# Patient Record
Sex: MALE | Race: WHITE | NOT HISPANIC OR LATINO | Employment: FULL TIME | URBAN - METROPOLITAN AREA
[De-identification: names, ages, dates, MRNs, and addresses within clinical notes are randomized per-mention and may not be internally consistent; named-entity substitution may affect disease eponyms.]

---

## 2024-01-05 DIAGNOSIS — F41.9 ANXIETY: ICD-10-CM

## 2024-01-05 RX ORDER — CLONAZEPAM 1 MG/1
TABLET ORAL
COMMUNITY
End: 2024-01-05 | Stop reason: SDUPTHER

## 2024-01-05 RX ORDER — VORTIOXETINE 10 MG/1
10 TABLET, FILM COATED ORAL DAILY
COMMUNITY
Start: 2023-12-03 | End: 2024-01-10 | Stop reason: SDUPTHER

## 2024-01-05 RX ORDER — CLONAZEPAM 1 MG/1
TABLET ORAL
Qty: 45 TABLET | Refills: 0 | Status: SHIPPED | OUTPATIENT
Start: 2024-01-05

## 2024-01-05 NOTE — PROGRESS NOTES
Received refill request via email. FU scheduled for 1/10/23 but patient reports he will run out before next visit. Reviewed OARRS on 1/5/23. No discrepancies or concerns noted. Clonazepam 1mg (45 tablets) last filled on 12/6/23. Refill placed.

## 2024-01-10 ENCOUNTER — OFFICE VISIT (OUTPATIENT)
Dept: BEHAVIORAL HEALTH | Facility: CLINIC | Age: 44
End: 2024-01-10
Payer: COMMERCIAL

## 2024-01-10 ENCOUNTER — LAB (OUTPATIENT)
Dept: LAB | Facility: LAB | Age: 44
End: 2024-01-10
Payer: COMMERCIAL

## 2024-01-10 ENCOUNTER — OFFICE VISIT (OUTPATIENT)
Dept: PRIMARY CARE | Facility: CLINIC | Age: 44
End: 2024-01-10
Payer: COMMERCIAL

## 2024-01-10 VITALS
OXYGEN SATURATION: 96 % | HEIGHT: 64 IN | RESPIRATION RATE: 16 BRPM | WEIGHT: 163.58 LBS | HEART RATE: 76 BPM | DIASTOLIC BLOOD PRESSURE: 80 MMHG | SYSTOLIC BLOOD PRESSURE: 124 MMHG | BODY MASS INDEX: 27.93 KG/M2

## 2024-01-10 VITALS
TEMPERATURE: 98 F | HEIGHT: 64 IN | WEIGHT: 163.6 LBS | DIASTOLIC BLOOD PRESSURE: 87 MMHG | BODY MASS INDEX: 27.93 KG/M2 | SYSTOLIC BLOOD PRESSURE: 123 MMHG | HEART RATE: 80 BPM | RESPIRATION RATE: 16 BRPM

## 2024-01-10 DIAGNOSIS — Z00.00 HEALTHCARE MAINTENANCE: ICD-10-CM

## 2024-01-10 DIAGNOSIS — Z00.00 ENCOUNTER FOR PREVENTIVE HEALTH EXAMINATION: ICD-10-CM

## 2024-01-10 DIAGNOSIS — Z00.00 ENCOUNTER FOR PREVENTIVE HEALTH EXAMINATION: Primary | ICD-10-CM

## 2024-01-10 DIAGNOSIS — F51.5 NIGHTMARES: ICD-10-CM

## 2024-01-10 DIAGNOSIS — F41.9 ANXIETY: ICD-10-CM

## 2024-01-10 DIAGNOSIS — F33.9 RECURRENT MAJOR DEPRESSIVE DISORDER, REMISSION STATUS UNSPECIFIED (CMS-HCC): ICD-10-CM

## 2024-01-10 DIAGNOSIS — F43.9 TRAUMA AND STRESSOR-RELATED DISORDER: ICD-10-CM

## 2024-01-10 PROBLEM — F32.9 MDD (MAJOR DEPRESSIVE DISORDER): Status: ACTIVE | Noted: 2024-01-10

## 2024-01-10 LAB
ALBUMIN SERPL BCP-MCNC: 4.6 G/DL (ref 3.4–5)
ALP SERPL-CCNC: 65 U/L (ref 33–120)
ALT SERPL W P-5'-P-CCNC: 41 U/L (ref 10–52)
ANION GAP SERPL CALC-SCNC: 14 MMOL/L (ref 10–20)
AST SERPL W P-5'-P-CCNC: 25 U/L (ref 9–39)
BILIRUB SERPL-MCNC: 0.4 MG/DL (ref 0–1.2)
BUN SERPL-MCNC: 18 MG/DL (ref 6–23)
CALCIUM SERPL-MCNC: 10 MG/DL (ref 8.6–10.6)
CHLORIDE SERPL-SCNC: 100 MMOL/L (ref 98–107)
CHOLEST SERPL-MCNC: 201 MG/DL (ref 0–199)
CHOLESTEROL/HDL RATIO: 5.6
CO2 SERPL-SCNC: 30 MMOL/L (ref 21–32)
CREAT SERPL-MCNC: 0.79 MG/DL (ref 0.5–1.3)
EGFRCR SERPLBLD CKD-EPI 2021: >90 ML/MIN/1.73M*2
ERYTHROCYTE [DISTWIDTH] IN BLOOD BY AUTOMATED COUNT: 13 % (ref 11.5–14.5)
EST. AVERAGE GLUCOSE BLD GHB EST-MCNC: 94 MG/DL
GLUCOSE SERPL-MCNC: 76 MG/DL (ref 74–99)
HBA1C MFR BLD: 4.9 %
HCT VFR BLD AUTO: 48.8 % (ref 41–52)
HDLC SERPL-MCNC: 36 MG/DL
HGB BLD-MCNC: 16.4 G/DL (ref 13.5–17.5)
LDLC SERPL CALC-MCNC: 86 MG/DL
MCH RBC QN AUTO: 28.9 PG (ref 26–34)
MCHC RBC AUTO-ENTMCNC: 33.6 G/DL (ref 32–36)
MCV RBC AUTO: 86 FL (ref 80–100)
NON HDL CHOLESTEROL: 165 MG/DL (ref 0–149)
NRBC BLD-RTO: 0 /100 WBCS (ref 0–0)
PLATELET # BLD AUTO: 261 X10*3/UL (ref 150–450)
POTASSIUM SERPL-SCNC: 4.1 MMOL/L (ref 3.5–5.3)
PROT SERPL-MCNC: 7.5 G/DL (ref 6.4–8.2)
RBC # BLD AUTO: 5.67 X10*6/UL (ref 4.5–5.9)
SODIUM SERPL-SCNC: 140 MMOL/L (ref 136–145)
TRIGL SERPL-MCNC: 397 MG/DL (ref 0–149)
VLDL: 79 MG/DL (ref 0–40)
WBC # BLD AUTO: 9.5 X10*3/UL (ref 4.4–11.3)

## 2024-01-10 PROCEDURE — 1036F TOBACCO NON-USER: CPT | Performed by: INTERNAL MEDICINE

## 2024-01-10 PROCEDURE — 80053 COMPREHEN METABOLIC PANEL: CPT

## 2024-01-10 PROCEDURE — 99214 OFFICE O/P EST MOD 30 MIN: CPT

## 2024-01-10 PROCEDURE — 85027 COMPLETE CBC AUTOMATED: CPT

## 2024-01-10 PROCEDURE — 83036 HEMOGLOBIN GLYCOSYLATED A1C: CPT

## 2024-01-10 PROCEDURE — 99396 PREV VISIT EST AGE 40-64: CPT | Performed by: INTERNAL MEDICINE

## 2024-01-10 PROCEDURE — 36415 COLL VENOUS BLD VENIPUNCTURE: CPT

## 2024-01-10 PROCEDURE — 80061 LIPID PANEL: CPT

## 2024-01-10 RX ORDER — CLONAZEPAM 1 MG/1
1.5 TABLET ORAL DAILY PRN
Qty: 45 TABLET | Refills: 0 | Status: SHIPPED | OUTPATIENT
Start: 2024-04-01

## 2024-01-10 RX ORDER — ACETAMINOPHEN 500 MG
5 TABLET ORAL DAILY PRN
COMMUNITY

## 2024-01-10 RX ORDER — VORTIOXETINE 10 MG/1
10 TABLET, FILM COATED ORAL DAILY
Qty: 90 TABLET | Refills: 1 | Status: SHIPPED | OUTPATIENT
Start: 2024-01-10 | End: 2024-04-03 | Stop reason: SDUPTHER

## 2024-01-10 RX ORDER — AZITHROMYCIN 500 MG/1
TABLET, FILM COATED ORAL
COMMUNITY
Start: 2023-03-07

## 2024-01-10 RX ORDER — CLONAZEPAM 1 MG/1
1.5 TABLET ORAL DAILY PRN
Qty: 45 TABLET | Refills: 0 | Status: SHIPPED | OUTPATIENT
Start: 2024-03-03

## 2024-01-10 RX ORDER — CLONAZEPAM 1 MG/1
1.5 TABLET ORAL DAILY PRN
Qty: 45 TABLET | Refills: 0 | Status: SHIPPED | OUTPATIENT
Start: 2024-02-03 | End: 2025-02-02

## 2024-01-10 RX ORDER — ATOVAQUONE AND PROGUANIL HYDROCHLORIDE 250; 100 MG/1; MG/1
TABLET, FILM COATED ORAL
COMMUNITY
Start: 2023-03-07

## 2024-01-10 NOTE — PROGRESS NOTES
"Subjective   Patient ID: Jaycob Caputo is a 43 y.o. male who presents for Anxiety, Depression, Med Management, and Nightmares Last visit with this writer on 9/13/23.     HPI  Patient arrived on-time for in-person visit. A/Ox3. When asked how he is doing patient reports \"doing pretty well\". Appreciating being in a supportive relationship - \"pretty stable with that\". Work - \"tough\". Describes feelings of burnout at work, especially when things are slow. Working to resume exercise routine which has been very helpful for mood/anxiety. Covid + in october, no lingering symptoms. + URI in November which hindered exercise. Looking forward to international volunteer trip to [facility] in April with friend (nataly). Mood - improving. \"A little stressful at times\" with adjustment in relationship but overall reports feeling \"happy\". Continues to see establish counselor twice per month, comfortable with frequency     Mood: \"generally pretty good\"   - intermittent overwhelmed/burnout days   No anhedonia - enjoying [employer] games/time with GF/social interactions   Sleep: 6-7hours/night - nightmares continue intermittently but do not disrupt sleep.  Energy levels: + fatigue with recent illness, improving.   Focus/concentration: adequate. Focused during visit.   hopelessness/guilt - denies   No SI/HI or thoughts of death.      Anxiety:   Stable   Intermittent overwhelmed days   intermittent worrying - using coping to skills manage high anxiety days   No daytime panic attacks/feeling overwhelmed     The[place]: [place] [facility] ([facility] [place] bi-weekly.    MJ use: infrequent MJ edible use (1-2x/month).   Alcohol use: 5 drinks/week.   No illicit drug use  No caffeine: 1-2 cups of tea most mornings. Rare energy drinks      Objective   Physical Exam  Constitutional:       Appearance: Normal appearance.   Neurological:      Mental Status: He is alert and oriented to person, place, and time.     General Appearance: Well " "groomed, appropriate eye contact  Attitude/Behavior: Cooperative  Motor: No psychomotor agitation or retardation, no tremor or other abnormal movements  Speech: Normal rate, volume, prosody  Gait/Station: WFL - Within functional limits  Mood: \"generally pretty good\"  Affect: Constricted  Thought Process: Linear, goal directed  Thought Associations: No loosening of associations  Perception: No perceptual abnormalities noted  Sensorium: Alert and oriented to person, place, time and situation  Insight: Intact  Judgement: Intact  Cognition: Cognitively intact to conversational testing with respect to attention, orientation, fund of knowledge, recent and remote memory, and language    Lab Review:   not applicable    Assessment/Plan   Problem List Items Addressed This Visit             ICD-10-CM    Anxiety F41.9    Relevant Medications    Trintellix 10 mg tablet tablet    MDD (major depressive disorder) F32.9    Relevant Medications    Trintellix 10 mg tablet tablet    Trauma and stressor-related disorder F43.9    Relevant Medications    Trintellix 10 mg tablet tablet    Nightmares F51.5   Mr. Caputo is a 41 year old male currently living alone in [place], [place]. Employed full-time as a  and works in a group home. PPhx of anxiety, depression and nightmares/intrusive thoughts. He describes having never been formally diagnosed with PTSD but that he and Dr. Demarco have discussed that many of his symptoms appear to correlate with past traumas experienced.      DSM-5 Diagnosis   anxiety disorder   MDD  Nightmares vs night terrors   unspecified trauma and stressor related disorder      Current Medications  Trintellix 10mg daily   Clonazepam 1.5mg daily PRN - last filled on 1/5/24 for 45 tablets/30days   - previously on 2mg daily, now 1mg most days with intermittent PRN dose (requiring 3x the past two weeks)  - Reviewed OARRS on 1/10/24, no discrepancies or concerns noted.   - Drug screen + cannabinoid level " completed on 1/23/23   - controlled substance agreement completed on 9/26/22     - mood is stable. No SI/HI  - anxiety stable   - No daytime panic attacks  -c/w Trintellix 10mg daily, c/w Clonazepam 1mg (1.5 tablets) daily PRN  - Encouraged to continue diet/exercise regiment and healthy choices   - Encouraged to continue in counseling   - yearly drug screen ordered per protocol.   - CSA completed this visit   - encouraged patient to communicate any questions, concerns or side effects if recognized.   - patient is aware this writer is leaving  March 2024, He wishes to continue care with  Psychiatry.   - discussed transitioning care to [facility] (where counselor is ) as a plan if needed   - Jaycob is agreeable to plan detailed above     Start time 1:02pm  End time 1:35pm   Prep/charting time 5min   Total time 38min

## 2024-01-10 NOTE — PATIENT INSTRUCTIONS
Continue Trintellix 10mg daily   Continue Clonazepam 1.5mg daily as needed  Please call  Psychiatry ([phone]) to schedule visit with next provider   Take care Mr. Caputo, it was a pleasure working with you

## 2024-01-10 NOTE — PROGRESS NOTES
"Complete Physical Exam    Jaycob is a pleasant 43-year-old male here for physical.  Has started exercising and eating better.  Feeling much better.  No complaints today.  Follows with psychiatry.  Symptoms well-controlled.    Family history, social history reviewed.  Does not smoke, no drugs.  Drinks very occasionally.    ROS:  No unintentional weight gain or weight loss, no fevers no chills, no night sweats.  No fatigue.    No congestion runny nose sore throat.  No ear pain.  No tinnitus.  No change in hearing.  No change in vision.    No neck pain.    No cough no shortness of breath.  No wheezing.    No chest pain shortness of breath palpitations with rest or with activity.  No orthopnea no PND.  No edema.    No abdominal pain, no nausea vomiting diarrhea.  No dark stools.  No dysphagia, no anorexia.  Appetite intact.    No heat or cold intolerance, constipation diarrhea, weight changes.  No polyuria polydipsia.      No joint pain besides occasional aches and pains,  No muscle weakness.    No new rash.    No headache, no change in memory, no change in cognition.  No weakness numbness tingling of extremities.  No difficulty with gait.    No easy bruisability.    No feeling of sadness, loss of interest, anxiety.    No difficulty urinating, no dribbling, no hesitancy.  No testicular pain.    Vitals and exam:Blood pressure 124/80, pulse 76, resp. rate 16, height 1.626 m (5' 4\"), weight 74.2 kg (163 lb 9.3 oz), SpO2 96 %.  Calm coherent well-appearing.    Neck is supple with no tenderness, thyroid mobile soft with no nodules, oropharynx clear.  Sinuses nontender.    Breathing comfortably, clear to auscultation bilaterally.    Regular rate and rhythm, no murmur gallops or rubs, good distal pulses.  No edema.  No JVD.  No carotid bruit.    Abdomen is soft, nontender, normal bowel sounds.  No ascites.  No hepatosplenomegaly.    Upper and lower extremity joints intact with full active range of motion.  Strength is 5 out " of 5 in upper and lower extremities.    Skin is grossly normal, moist.     Extremity warm, well-perfused, no clubbing or cyanosis.    Coherent, cognition intact, memory intact.  Gait intact.    No cervical, supraclavicular, axillary or inguinal lymphadenopathy.          Assessment plan: Jaycob is a pleasant 43-year-old male here for physical, overall doing well, has improved his lifestyle.  Immunization up-to-date, check routine labs today.  Follow-up in 1 year.

## 2024-04-03 ENCOUNTER — OFFICE VISIT (OUTPATIENT)
Dept: BEHAVIORAL HEALTH | Facility: CLINIC | Age: 44
End: 2024-04-03
Payer: COMMERCIAL

## 2024-04-03 VITALS
SYSTOLIC BLOOD PRESSURE: 136 MMHG | TEMPERATURE: 98 F | HEART RATE: 73 BPM | RESPIRATION RATE: 16 BRPM | WEIGHT: 166.7 LBS | BODY MASS INDEX: 28.46 KG/M2 | HEIGHT: 64 IN | DIASTOLIC BLOOD PRESSURE: 90 MMHG

## 2024-04-03 DIAGNOSIS — F41.9 ANXIETY: ICD-10-CM

## 2024-04-03 DIAGNOSIS — F33.9 RECURRENT MAJOR DEPRESSIVE DISORDER, REMISSION STATUS UNSPECIFIED (CMS-HCC): ICD-10-CM

## 2024-04-03 DIAGNOSIS — F43.9 TRAUMA AND STRESSOR-RELATED DISORDER: ICD-10-CM

## 2024-04-03 PROCEDURE — 99213 OFFICE O/P EST LOW 20 MIN: CPT | Performed by: PSYCHIATRY & NEUROLOGY

## 2024-04-03 RX ORDER — VORTIOXETINE 10 MG/1
10 TABLET, FILM COATED ORAL DAILY
Qty: 30 TABLET | Refills: 0 | Status: SHIPPED | OUTPATIENT
Start: 2024-04-03 | End: 2024-05-03

## 2024-04-03 RX ORDER — CLONAZEPAM 1 MG/1
1 TABLET ORAL 2 TIMES DAILY
Qty: 60 TABLET | Refills: 0 | Status: SHIPPED | OUTPATIENT
Start: 2024-05-02 | End: 2024-06-10 | Stop reason: SDUPTHER

## 2024-04-03 NOTE — PROGRESS NOTES
"Outpatient Psychiatry    Subjective   Jaycob Caputo, a 43 y.o. male, presenting to Psychiatry for evaluation.  Patient is referred by Lopez Burton MD.         Chief Complaint: \"I am okay\"    HPI:    Patient reports that he had been seeing Dr. Ivan Demarco for 15 years.      He rerports one of his biggest stressors is that his step brother has cancer and is unlikely to survive.  He is scheduled to leave Sunday for [facility] with [facility] to help with turtle conservation and he feels badly that he cannot be here for him.      He attended [place] [employer] school and loves working with kids.  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     He is in a recent relationship for the past 8 months and met her at a [employer].  He reports being very happy in this relationship.      Patient takes Melatonin  PRN  and Klonipin PRN which has recently been reduced.      Patient reports chronic nightmares which started at the age of 17.  His nightmares can be \"intense\" and he can wake up in a panic and he  feels completely rigid and unable to move.  He will get up and take Klonipin at this time, listen to music, go to the  gym and the episode will subside.  He does not have  panic attacks during the day.      Patient reports a long history of anxiety and depression and he has been on Trintellix for a few years which has been helpful.  He would like to decrease the Trintellix early this summer.      He currently denies any depression.    He does feel overwhelmed and \"burned out\" from work and his step brother being ill.  He has a lot of stress at work and he began doing [employer] on the side for his expenses such as his trip to [place].      Patient denies SI, HI, manic or psychotic symptoms.      Psychiatric Review Of Systems:  Depressive Symptoms: negative  Manic Symptoms: negative  Anxiety Symptoms: Panic AttackPanic Attack Behaviors: wakes up from nightmares feeling panicked, " rigid   Psychotic Symptoms: negative  Other Symptoms:    Current Medications:  Trintellix 10 mg PO daily  Klonipin 1 mg PO BID PRN    Medical History:  No past medical history on file.    Past Psychiatric History:   Diagnoses:   possible PTSD, anxiety, depression  Previous Psychiatrist:  Therapy:   Mika Laurent, specializes in nightmares/night time behavior - is on leave currently, he has appointment May 1, 2024  Current psychiatric medications:  Past psychiatric medications: Cymbalta ok; SSRIs cannot take - gets more anxious;   Past psychiatric treatments:   Hospitalizations:none  Suicide attempts: none  Family psychiatric history:unknown    Social History:   Currently lives:  [place], lives with self, has girlfriend who has kids,  8 months who is ; she does house cleaning; he tried online dating; kids ages 14, 11 and 8    Education:   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; [employer]    History of Learning Problem: none reported  Work/Finances:  car business for 6 years; ran summer camp; [employer]   Marital history/children: none  Current stressors:  step brother has cancer  Social support: girlfriend   Legal History: none   History: none  Family:   age 11; parents remarried   Siblings: blood brother age 39, 4 step sisters and 2 step brother;  end stage cancer step brother age 46 have 8 year old  History of violence: none reported    Substance Use History:  Tobacco use: none  Use of alcohol:  unknown  Use of caffeine:  unknown  Use of other substances: unknown  Legal consequences of substance use: n/a  Substance use disorder treatment: n/a    Record Review: brief     Medical Review Of Systems:  Pertinent items are noted in HPI.    OARRS:  No data recorded  I have personally reviewed the OARRS report for Jaycob Caputo. I have considered the risks of abuse, dependence, addiction and diversion and I  "believe that it is clinically appropriate for Jaycob Caputo to be prescribed this medication    Is the patient prescribed a combination of a benzodiazepine and opioid?  No    Last Urine Drug Screen: 11/23/2023     Controlled Substance Agreement:    Date of the Last Agreement: will do at next visit    Benzodiazepines:    Activities of Daily Living:   Is your overall impression that this patient is benefiting (symptom reduction outweighs side effects) from benzodiazepine therapy? Yes     Objective   Mental Status Exam  Appearance: casually dressed, good g/h  Attitude: Calm, cooperative, and engaged in conversation.  Behavior: Appropriate eye contact.   Motor Activity: No psychomotor agitation or retardation. No abnormal movements, tremors or tics. No evidence of extrapyramidal symptoms or tardive dyskinesia.  Speech: Regular rate, rhythm, volume. Spontaneous, no pressured speech.  Mood: \"ok\"  Affect: Euthymic, full range, mood congruent.  Thought Process: Linear, logical, and goal-directed. No loose associations or gross thought disorganization.  Thought Content: Denied current suicidal ideation or thoughts of harm to self, denied homicidal ideation or thoughts of harm to others. No delusional thinking elicited. No perseverations or obsessions identified.   Perception: Did not endorse auditory or visual hallucinations, did not appear to be responding to hallucinatory stimuli.   Cognition: Alert, oriented x3. Preserved attention span and concentration, recent and remote memory. Adequate fund of knowledge. No deficits in language.   Insight: Fair, in regards to understanding mental health condition  Judgement: Fair      Vitals:  Vitals:    04/03/24 0959   BP: 136/90   Pulse: 73   Resp: 16   Temp: 36.7 °C (98 °F)       Risk Assessment:    Risk of harm to self: low    Risk of harm to others: low      DXS:   Anxiety unspecified  MDD, recurrent    Assessment:  Patient is a 43 year old  male  with a history of " depression and anxiety who is  generally stable on Trintellix 10 mg PO daily and Klonipin PRN for anxiety.  He reports chronic intense nightmares when he wakes up in a state of panic, feels rigid and takes Klonipin at this time.  He has current stressors from work, finances and serious illness of step brother.  Patient is hoping to decrease Trintellix in the early summer with the  hopes of discontinuing it completely.          Patient denies SI, HI, manic or psychotic symptoms.  No side effects reported.       Plan/Recommendations:  Medications: continue Trintellix 10 mg PO daily and Klonipin 1 mg PO BID PRN  Orders: continue therapy  Follow up: 4 weeks  Call  Psychiatry at [phone] with issues.  For [facility] residents, [employer] is a 24/7 hotline you can call for assistance [662.504.3294]. Please call 985/310 or go to your closest Emergency Room if you feel unsafe. This includes thoughts of hurting yourself or anyone else, or having other troubles such as hearing voices, seeing visions, or having new and scary thoughts about the people around you.    Review with patient: Treatment plan reviewed with the patient.  Medication risks/benefit reviewed with the patient          Nano Owens MD

## 2024-05-08 ENCOUNTER — OFFICE VISIT (OUTPATIENT)
Dept: BEHAVIORAL HEALTH | Facility: CLINIC | Age: 44
End: 2024-05-08
Payer: COMMERCIAL

## 2024-05-08 VITALS
HEIGHT: 64 IN | RESPIRATION RATE: 16 BRPM | SYSTOLIC BLOOD PRESSURE: 130 MMHG | WEIGHT: 167.4 LBS | DIASTOLIC BLOOD PRESSURE: 91 MMHG | HEART RATE: 75 BPM | BODY MASS INDEX: 28.58 KG/M2 | TEMPERATURE: 98.1 F

## 2024-05-08 DIAGNOSIS — F41.9 ANXIETY: ICD-10-CM

## 2024-05-08 PROCEDURE — 99213 OFFICE O/P EST LOW 20 MIN: CPT | Performed by: PSYCHIATRY & NEUROLOGY

## 2024-05-08 NOTE — PROGRESS NOTES
"  Subjective   Jaycob Caputo, a 43 y.o. male, presenting to Psychiatry for evaluation.  Patient is referred by Lopez Burton MD.         \"I am all right\"         Patient went on his trip two weeks ago with a good friend he met on his first trip..     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   Not much to do during the day and could not go in the ocean due to rip tides  and rocks.     Unfortunately his brother passed away a month ago a few days before his trip and he   was able to go to the  .  His brother had an 8 year old daughter and wife.    On previous trips he went to [facility] [facility], [facility] and   [place].    Patient would still would like to get off Trintellix which he has been on for three years.    He feels that it takes away some of his feelings and  he   \"wants to feel things again\" and see what his baseline it at this  time.    Patient said he is very good at knowing when he needs to go  back on the medication.    If needed he feels depressed (situational) and realizes he needs it again.    He feels life is very stable now.     Takes Klonipin 1 mg daily and at night as needed.    Melatonin a few times a week if he drinks caffeine.    Denies any side effects from medications.     Patient denies SI, HI, manic or psychotic symptoms.        Psychiatric Review Of Systems:  Depressive Symptoms: negative  Manic Symptoms: negative  Anxiety Symptoms: Panic AttackPanic Attack Behaviors: wakes up from nightmares feeling panicked, rigid   Psychotic Symptoms: negative  Other Symptoms:     Current Medications:  Trintellix 10 mg PO daily  Klonipin 1 mg PO BID PRN    Medical History:  Medical History   No past medical history on file.        Past Psychiatric History:   Diagnoses:   possible PTSD, anxiety, depression  Previous Psychiatrist:  Therapy:   Mika Laurent, specializes in nightmares/night " time behavior - is on leave currently, he has appointment May 1, 2024  Current psychiatric medications:  Past psychiatric medications: Cymbalta ok; SSRIs cannot take - gets more anxious;   Past psychiatric treatments:   Hospitalizations:none  Suicide attempts: none  Family psychiatric history:unknown     Social History:   Currently lives:  [place], lives with self, has girlfriend who has kids,  8 months who is ; she does house cleaning; he tried online dating; kids ages 14, 11 and 8    Education:   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; [employer] , [employer]    History of Learning Problem: none reported  Work/Finances:  car business for 6 years; ran summer camp; [employer]   Marital history/children: none  Current stressors:  step brother has cancer  Social support: girlfriend   Legal History: none   History: none  Family:   age 11; parents remarried   Siblings: blood brother age 39, 4 step sisters and 2 step brother;  end stage cancer step brother age 46 have 8 year old  History of violence: none reported     Substance Use History:  Tobacco use: none  Use of alcohol:  unknown  Use of caffeine:  unknown  Use of other substances: unknown  Legal consequences of substance use: n/a  Substance use disorder treatment: n/a     Record Review: brief     Medical Review Of Systems:  Pertinent items are noted in HPI.     OARRS:  No data recorded  I have personally reviewed the OARRS report for Jaycob Caputo. I have considered the risks of abuse, dependence, addiction and diversion and I believe that it is clinically appropriate for Jaycob Caputo to be prescribed this medication     Is the patient prescribed a combination of a benzodiazepine and opioid?  No     Last Urine Drug Screen: ordered today     Controlled Substance Agreement:  signed today     Activities of Daily Living:   Is your overall impression that this patient is benefiting  "(symptom reduction outweighs side effects) from benzodiazepine therapy? Yes        Objective   Mental Status Exam  Appearance: casually dressed, good g/h  Attitude: Calm, cooperative, and engaged in conversation.  Behavior: Appropriate eye contact.   Motor Activity: No psychomotor agitation or retardation. No abnormal movements, tremors or tics. No evidence of extrapyramidal symptoms or tardive dyskinesia.  Speech: Regular rate, rhythm, volume. Spontaneous, no pressured speech.  Mood: \"okay\"  Affect: Euthymic, full range, mood congruent.  Thought Process: Linear, logical, and goal-directed. No loose associations or gross thought disorganization.  Thought Content: Denied current suicidal ideation or thoughts of harm to self, denied homicidal ideation or thoughts of harm to others. No delusional thinking elicited. No perseverations or obsessions identified.   Perception: Did not endorse auditory or visual hallucinations, did not appear to be responding to hallucinatory stimuli.   Cognition: Alert, oriented x3. Preserved attention span and concentration, recent and remote memory. Adequate fund of knowledge. No deficits in language.   Insight: Fair, in regards to understanding mental health condition  Judgement: Fair        Vitals:    24 1020   BP: (!) 130/91   Pulse: 75   Resp: 16   Temp: 36.7 °C (98.1 °F)         Risk Assessment:     Risk of harm to self: low     Risk of harm to others: low      DXS:   Anxiety unspecified  MDD, recurrent     Assessment:  Patient is a 43 year old  male  with a history of depression and anxiety who is  generally stable on Trintellix 10 mg PO daily and Klonipin for anxiety.  He would like to get off of Trintellix  to see what his  baseline is at this time.  He feels he is very stable now and would like to feel his emotions more fully as the medication makes him less emotional.      Patient unfortunately lost his brother to cancer last month.  He was  able  to attend the  " prior to his trip to [place].     Patient denies SI, HI, manic or psychotic symptoms.  No side effects reported.        Plan/Recommendations:  Medications: decrease Trintellix to 5 mg PO daily and continue Klonipin 1 mg PO daily and 1 mg PO at bedtime PRN  Orders: continue therapy  Follow up: 4 weeks  Call  Psychiatry at [phone] with issues.  For [facility], [employer] is a 24/7 hotline you can call for assistance [[phone]]. Please call [phone]/748 or go to your closest Emergency Room if you feel unsafe. This includes thoughts of hurting yourself or anyone else, or having other troubles such as hearing voices, seeing visions, or having new and scary thoughts about the people around you.     Review with patient: Treatment plan reviewed with the patient.  Medication risks/benefit reviewed with the patient

## 2024-05-21 ENCOUNTER — HOSPITAL ENCOUNTER (OUTPATIENT)
Dept: RADIOLOGY | Facility: CLINIC | Age: 44
Discharge: HOME | End: 2024-05-21
Payer: COMMERCIAL

## 2024-05-21 DIAGNOSIS — S69.91XA RIGHT WRIST INJURY, INITIAL ENCOUNTER: ICD-10-CM

## 2024-05-21 PROCEDURE — 73110 X-RAY EXAM OF WRIST: CPT | Mod: RIGHT SIDE | Performed by: RADIOLOGY

## 2024-05-21 PROCEDURE — 73110 X-RAY EXAM OF WRIST: CPT | Mod: RT

## 2024-06-10 ENCOUNTER — LAB (OUTPATIENT)
Dept: LAB | Facility: LAB | Age: 44
End: 2024-06-10
Payer: COMMERCIAL

## 2024-06-10 DIAGNOSIS — F43.9 TRAUMA AND STRESSOR-RELATED DISORDER: ICD-10-CM

## 2024-06-10 DIAGNOSIS — F41.9 ANXIETY: ICD-10-CM

## 2024-06-10 PROCEDURE — 80307 DRUG TEST PRSMV CHEM ANLYZR: CPT

## 2024-06-10 PROCEDURE — 80349 CANNABINOIDS NATURAL: CPT

## 2024-06-10 RX ORDER — CLONAZEPAM 1 MG/1
1 TABLET ORAL 2 TIMES DAILY
Qty: 60 TABLET | Refills: 0 | Status: SHIPPED | OUTPATIENT
Start: 2024-06-10 | End: 2024-07-10

## 2024-06-11 LAB
AMPHETAMINES UR QL SCN: ABNORMAL
BARBITURATES UR QL SCN: ABNORMAL
BENZODIAZ UR QL SCN: ABNORMAL
BZE UR QL SCN: ABNORMAL
CANNABINOIDS UR QL SCN: ABNORMAL
FENTANYL+NORFENTANYL UR QL SCN: ABNORMAL
METHADONE UR QL SCN: ABNORMAL
OPIATES UR QL SCN: ABNORMAL
OXYCODONE+OXYMORPHONE UR QL SCN: ABNORMAL
PCP UR QL SCN: ABNORMAL

## 2024-06-14 LAB — CARBOXYTHC UR-MCNC: 60 NG/ML

## 2024-07-03 ENCOUNTER — APPOINTMENT (OUTPATIENT)
Dept: BEHAVIORAL HEALTH | Facility: CLINIC | Age: 44
End: 2024-07-03
Payer: COMMERCIAL

## 2024-07-03 DIAGNOSIS — F41.9 ANXIETY: ICD-10-CM

## 2024-07-03 PROCEDURE — 99214 OFFICE O/P EST MOD 30 MIN: CPT | Performed by: PSYCHIATRY & NEUROLOGY

## 2024-07-03 NOTE — PROGRESS NOTES
"      Subjective   Jaycob Caputo, a 43 y.o. male, presenting to Psychiatry for evaluation.  Patient is referred by Lopez Burton MD.         \"I am all right\"      Patient said he has been pretty good.  He had a therapy session this morning which went well.    He reports that the last six months have been very rough.  He still is struggling with the loss of his brother.  His mom's  got an infection and had a triple bypass which has been stressful.  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    He is now 10 months into a new relationship which he is happy about.     He does report some depression and anxiety.    He has been having some nightmares which have been \"intense but not awful\"    He has been  going to the gym which keeps him mentally healthy.    He has been off of the Trintellix for about a  month     In past he went back on Trintellix when other things were  not working (such as eating well, working out)    Felt some of the side effects of the Trintellix included emotional stunting (hard to cry), not feeling things, having trouble losing weight    Wanted to feel what his baseline is off of the  medication and he is usually able to know when he needs to go back on the medication     Feels he has \"situational depression\" and he can get himself out of it by excercising and eating healthy    Many people rely on him for a lot - learning to say no     Lately taking Klonipn 2-3 times a week to relax and to sleep    He is concerned  about his Dad who is starting to show some memory changes at age 76 (has been emotionally abused by his wife for 30 years)      Patient denies SI, HI, manic or psychotic symptoms.        Psychiatric Review Of Systems:  Depressive Symptoms: negative  Manic Symptoms: negative  Anxiety Symptoms: Panic AttackPanic Attack Behaviors: wakes up from nightmares feeling panicked, rigid   Psychotic Symptoms: negative  Other Symptoms:     Current Medications:  Klonipin 1 mg PO " BID PRN    Medical History:  Medical History   No past medical history on file.         Past Psychiatric History:   Diagnoses:   possible PTSD, anxiety, depression  Previous Psychiatrist:   Therapy:   Mika Laurent, specializes in nightmares/night time behavior - is on leave currently, he has appointment May 1, 2024  Current psychiatric medications:  Past psychiatric medications: Cymbalta ok; SSRIs cannot take - gets more anxious;   Past psychiatric treatments:  unknown  Hospitalizations:none  Suicide attempts: none  Family psychiatric history: unknown     Social History:   Currently lives:  [place], lives with self, has girlfriend who has kids,  8 months who is ; she does house cleaning; he tried online dating; kids ages 14, 11 and 8    Education:   [place] in [place] [employer] and 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; [employer] , [facility] [employer]    History of Learning Problem: none reported  Work/Finances:  car business for 6 years; ran summer camp; [employer]   Marital history/children: none  Current stressors:  step brother has cancer  Social support: girlfriend   Legal History: none   History: none  Family:   age 11; parents remarried   Siblings: blood brother age 39, 4 step sisters and 2 step brother;  end stage cancer step brother age 46 have 8 year old  History of violence: none reported     Substance Use History:  Tobacco use: none  Use of alcohol:  unknown  Use of caffeine:  unknown  Use of other substances: unknown  Legal consequences of substance use: n/a  Substance use disorder treatment: n/a     Record Review: brief     Medical Review Of Systems:  Pertinent items are noted in HPI.     OARRS:  No data recorded  I have personally reviewed the OARRS report for Jaycob Caputo. I have considered the risks of abuse, dependence, addiction and diversion and I believe that it is clinically appropriate for Jaycob Caputo to be prescribed this  medication     Is the patient prescribed a combination of a benzodiazepine and opioid?  No     Last Urine Drug Screen:   Lab on 06/10/2024   Component Date Value Ref Range Status    Amphetamine Screen, Urine 06/10/2024 Presumptive Negative  Presumptive Negative Final    CUTOFF LEVEL: 500 NG/ML   Cross-reactivity has been reported with high concentrations   of the following drugs: buproprion, chloroquine, chlorpromazine,   ephedrine, mephentermine, fenfluramine, phentermine,   phenylpropanolamine, pseudoephedrine, and propranolol.    Barbiturate Screen, Urine 06/10/2024 Presumptive Negative  Presumptive Negative Final    CUTOFF LEVEL: 200 NG/ML    Benzodiazepines Screen, Urine 06/10/2024 Presumptive Negative  Presumptive Negative Final    CUTOFF LEVEL: 200 NG/ML    Cannabinoid Screen, Urine 06/10/2024 Presumptive Positive (A)  Presumptive Negative Final    CUTOFF LEVEL: 50 NG/ML    Cocaine Metabolite Screen, Urine 06/10/2024 Presumptive Negative  Presumptive Negative Final    CUTOFF LEVEL: 150 NG/ML    Fentanyl Screen, Urine 06/10/2024 Presumptive Negative  Presumptive Negative Final    CUTOFF LEVEL: 5 NG/ML    Opiate Screen, Urine 06/10/2024 Presumptive Negative  Presumptive Negative Final    CUTOFF LEVEL: 300 NG/ML  The opiate screen does not detect fentanyl, meperidine, or   tramadol. Oxycodone is not consistently detected (refer to  Oxycodone Screen, Urine result).    Oxycodone Screen, Urine 06/10/2024 Presumptive Negative  Presumptive Negative Final    CUTOFF LEVEL: 100 NG/ML  This test will accurately detect both oxycodone and oxymorphone.    PCP Screen, Urine 06/10/2024 Presumptive Negative  Presumptive Negative Final    CUTOFF LEVEL:  25 NG/ML  Cross-reactivity has been reported with dextromethorphan.    Methadone Screen, Urine 06/10/2024 Presumptive Negative  Presumptive Negative Final    CUTOFF LEVEL: 150 NG/ML  The metabolite L-alpha-acetylmethadol (LAAM) is not  detected by this method in concentrations  that would  be found in the urine of patients on LAAM therapy.    39-Kmg-8-carboxy-THC, Urn, Quant 06/10/2024 60  ng/mL Final    Comment: INTERPRETIVE INFORMATION: THC Metabolite, Urine,                             Quantitative    Methodology: Quantitative Liquid Chromatography-Tandem Mass   Spectrometry  Positive cutoff: 15 ng/mL  For medical purposes only; not valid for forensic use.  The drug analyte detected in this assay, 9-carboxy THC, is a   metabolite of delta-9-tetrahydrocannabinol (THC). Detection of   9-carboxy THC suggests use of, or exposure to, a product   containing THC.  This test cannot distinguish between prescribed   or non-prescribed forms of THC, nor can it distinguish between   active or passive use. The 9-carboxy THC metabolite can be   detected in urine for several weeks. Normalization of results to   creatinine concentration can help document elimination or suggest   recent use, when specimens are collected at least one week apart.    This test was developed and its performance characteristics   determined by [facility]. It has not been cleared or   approved by the US Food and Drug                            Administration. This test was   performed in a CLIA certified laboratory and is intended for   clinical purposes.  Performed By: [facility]  [address] [place] [id number]  : Jaycob Rodney MD, PhD  CLIA Number: [id number]         Controlled Substance Agreement:  signed today      Activities of Daily Living:   Is your overall impression that this patient is benefiting (symptom reduction outweighs side effects) from benzodiazepine therapy? Yes        Objective   Mental Status Exam  Appearance: casually dressed, good g/h  Attitude: Calm, cooperative, and engaged in conversation.  Behavior: Appropriate eye contact.   Motor Activity: No psychomotor agitation or retardation. No abnormal movements, tremors or tics. No evidence of extrapyramidal  "symptoms or tardive dyskinesia.  Speech: Regular rate, rhythm, volume. Spontaneous, no pressured speech.  Mood: \"okay\"  Affect: Euthymic, full range, mood congruent.  Thought Process: Linear, logical, and goal-directed. No loose associations or gross thought disorganization.  Thought Content: Denied current suicidal ideation or thoughts of harm to self, denied homicidal ideation or thoughts of harm to others. No delusional thinking elicited. No perseverations or obsessions identified.   Perception: Did not endorse auditory or visual hallucinations, did not appear to be responding to hallucinatory stimuli.   Cognition: Alert, oriented x3. Preserved attention span and concentration, recent and remote memory. Adequate fund of knowledge. No deficits in language.   Insight: Fair, in regards to understanding mental health condition  Judgement: Fair            Vitals:     05/08/24 1020   BP: (!) 130/91   Pulse: 75   Resp: 16   Temp: 36.7 °C (98.1 °F)         Risk Assessment:     Risk of harm to self: low     Risk of harm to others: low      DXS:   Anxiety unspecified  MDD, recurrent     Assessment:  Patient is a 43 year old  male  with a history of depression and anxiety who is  doing well off of Trintellix.  He continues to take Klonipin PRN for sleep and  anxiety.  He feels he is very stable now does not need the medication at this time.       Patient denies SI, HI, manic or psychotic symptoms.  No side effects reported.        Plan/Recommendations:  Medications: Trintellix has been discontinued; continue Klonipin 1 mg PO BID PRN anxiety/sleep  Orders: continue therapy  Follow up: 10-12 weeks  Call  Psychiatry at [phone] with issues.  For [facility] residents, [employer] is a 24/7 hotline you can call for assistance [[phone]]. Please call [phone] or go to your closest Emergency Room if you feel unsafe. This includes thoughts of hurting yourself or anyone else, or having other troubles such as " hearing voices, seeing visions, or having new and scary thoughts about the people around you.     Review with patient: Treatment plan reviewed with the patient.  Medication risks/benefit reviewed with the patient

## 2024-07-09 RX ORDER — CLONAZEPAM 1 MG/1
1 TABLET ORAL 2 TIMES DAILY
Qty: 60 TABLET | Refills: 1 | Status: SHIPPED | OUTPATIENT
Start: 2024-07-09 | End: 2024-09-07

## 2024-08-07 ENCOUNTER — LAB (OUTPATIENT)
Dept: LAB | Facility: LAB | Age: 44
End: 2024-08-07
Payer: COMMERCIAL

## 2024-08-07 ENCOUNTER — APPOINTMENT (OUTPATIENT)
Dept: BEHAVIORAL HEALTH | Facility: CLINIC | Age: 44
End: 2024-08-07
Payer: COMMERCIAL

## 2024-08-07 ENCOUNTER — OFFICE VISIT (OUTPATIENT)
Dept: PRIMARY CARE | Facility: CLINIC | Age: 44
End: 2024-08-07
Payer: COMMERCIAL

## 2024-08-07 VITALS
WEIGHT: 165 LBS | BODY MASS INDEX: 28.17 KG/M2 | HEIGHT: 64 IN | HEART RATE: 71 BPM | OXYGEN SATURATION: 95 % | SYSTOLIC BLOOD PRESSURE: 144 MMHG | DIASTOLIC BLOOD PRESSURE: 88 MMHG

## 2024-08-07 DIAGNOSIS — F41.9 ANXIETY: ICD-10-CM

## 2024-08-07 DIAGNOSIS — M79.605 PAIN OF LEFT LOWER EXTREMITY: ICD-10-CM

## 2024-08-07 DIAGNOSIS — E78.5 DYSLIPIDEMIA: Primary | ICD-10-CM

## 2024-08-07 DIAGNOSIS — E78.5 DYSLIPIDEMIA: ICD-10-CM

## 2024-08-07 LAB
CHOLEST SERPL-MCNC: 187 MG/DL (ref 0–199)
CHOLESTEROL/HDL RATIO: 5
HDLC SERPL-MCNC: 37.3 MG/DL
LDLC SERPL CALC-MCNC: 108 MG/DL
NON HDL CHOLESTEROL: 150 MG/DL (ref 0–149)
TRIGL SERPL-MCNC: 211 MG/DL (ref 0–149)
VLDL: 42 MG/DL (ref 0–40)

## 2024-08-07 PROCEDURE — 80061 LIPID PANEL: CPT

## 2024-08-07 PROCEDURE — 3008F BODY MASS INDEX DOCD: CPT | Performed by: INTERNAL MEDICINE

## 2024-08-07 PROCEDURE — 99213 OFFICE O/P EST LOW 20 MIN: CPT | Performed by: INTERNAL MEDICINE

## 2024-08-07 PROCEDURE — 99213 OFFICE O/P EST LOW 20 MIN: CPT | Performed by: PSYCHIATRY & NEUROLOGY

## 2024-08-07 PROCEDURE — 36415 COLL VENOUS BLD VENIPUNCTURE: CPT

## 2024-08-07 RX ORDER — GABAPENTIN 100 MG/1
100 CAPSULE ORAL 2 TIMES DAILY
Qty: 20 CAPSULE | Refills: 0 | Status: SHIPPED | OUTPATIENT
Start: 2024-08-07 | End: 2024-08-17

## 2024-08-07 RX ORDER — CLONAZEPAM 1 MG/1
1 TABLET ORAL 2 TIMES DAILY
Qty: 60 TABLET | Refills: 2 | Status: SHIPPED | OUTPATIENT
Start: 2024-09-09 | End: 2024-12-08

## 2024-08-07 ASSESSMENT — PATIENT HEALTH QUESTIONNAIRE - PHQ9
1. LITTLE INTEREST OR PLEASURE IN DOING THINGS: NOT AT ALL
2. FEELING DOWN, DEPRESSED OR HOPELESS: NOT AT ALL
SUM OF ALL RESPONSES TO PHQ9 QUESTIONS 1 AND 2: 0

## 2024-08-07 ASSESSMENT — ENCOUNTER SYMPTOMS
LOSS OF SENSATION IN FEET: 0
OCCASIONAL FEELINGS OF UNSTEADINESS: 0
DEPRESSION: 0

## 2024-08-07 ASSESSMENT — COLUMBIA-SUICIDE SEVERITY RATING SCALE - C-SSRS
1. IN THE PAST MONTH, HAVE YOU WISHED YOU WERE DEAD OR WISHED YOU COULD GO TO SLEEP AND NOT WAKE UP?: NO
6. HAVE YOU EVER DONE ANYTHING, STARTED TO DO ANYTHING, OR PREPARED TO DO ANYTHING TO END YOUR LIFE?: NO
2. HAVE YOU ACTUALLY HAD ANY THOUGHTS OF KILLING YOURSELF?: NO

## 2024-08-07 ASSESSMENT — LIFESTYLE VARIABLES
AUDIT-C TOTAL SCORE: 3
HOW OFTEN DO YOU HAVE A DRINK CONTAINING ALCOHOL: 2-3 TIMES A WEEK
SKIP TO QUESTIONS 9-10: 1
HOW OFTEN DO YOU HAVE SIX OR MORE DRINKS ON ONE OCCASION: NEVER
HOW MANY STANDARD DRINKS CONTAINING ALCOHOL DO YOU HAVE ON A TYPICAL DAY: 1 OR 2

## 2024-08-07 ASSESSMENT — PAIN SCALES - GENERAL: PAINLEVEL: 6

## 2024-08-07 NOTE — PROGRESS NOTES
"Subjective   Patient ID: Jaycob Caputo is a 43 y.o. male who presents for New Patient Visit (Patient requests blood work. (Cholesterol)) and Leg Pain (Patient has a metal isiah in his leg from a trauma surgeon. It is causing pain in his leg where he can't workout anymore without pain. ).    HPI patient presents to clinic now to establish care.  He had been a former patient of Dr. Burton.  He has been complaining of left leg pain for the past 2 months .he experiences increased pain with activity and gets relieved at rest.  He denies any trauma, swelling of the limb, fever, rashes and shortness of breath.  He had routine blood work done earlier this year with Dr. Burton.  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    HENT: Negative.     Eyes: Negative.    Respiratory: Negative.     Cardiovascular: Negative.    Gastrointestinal: Negative.    Endocrine: Negative.    Genitourinary: Negative.    Musculoskeletal:  Positive for arthralgias.   Skin: Negative.    Allergic/Immunologic: Negative.    Neurological: Negative.    Hematological: Negative.    Psychiatric/Behavioral: Negative.         Objective   /88 (BP Location: Right arm, Patient Position: Sitting)   Pulse 71   Ht 1.626 m (5' 4\")   Wt 74.8 kg (165 lb)   SpO2 95%   BMI 28.32 kg/m²     Physical Exam  Constitutional:       Appearance: Normal appearance. He is normal weight.   HENT:      Right Ear: Tympanic membrane normal.      Left Ear: Tympanic membrane and ear canal normal.      Nose: Nose normal.   Neck:      Vascular: No carotid bruit.   Cardiovascular:      Rate and Rhythm: Normal rate.   Pulmonary:      Effort: No respiratory distress.      Breath sounds: No stridor. No " wheezing.   Abdominal:      Palpations: Abdomen is soft.      Tenderness: There is no abdominal tenderness. There is no guarding or rebound.   Skin:     Coloration: Skin is not jaundiced.      Findings: No bruising.   Neurological:      General: No focal deficit present.      Mental Status: He is alert and oriented to person, place, and time.   Psychiatric:         Mood and Affect: Mood normal.         Assessment/Plan    patient will be scheduled for repeat lipid level regarding history of dyslipidemia.  He is scheduled to follow-up with trauma clinic regarding persistent left leg pain.  He is given  trial with gabapentin regarding limb discomfort.  He will continue other medications and will return to clinic in 6 months for follow-up visit.

## 2024-08-07 NOTE — PROGRESS NOTES
"    Subjective   Jaycob Caputo, a 43 y.o. male, presenting to Psychiatry for evaluation.  Patient is referred by Lopez Burton MD.         Virtual or Telephone Consent    An interactive audio and video telecommunication system which permits real time communications between the patient (at the originating site) and provider (at the distant site) was utilized to provide this telehealth service.   Verbal consent was requested and obtained from Jaycob Caputo on this date, 08/07/24 for a telehealth visit.         - patient reports he is doing okay     - reports he is having some \"physical challenges\"     - 24 years ago had a steel isiah placed in his left tibia from an injury he received during basketball when broke his tibia in six places    - orthopedic surgeon tried to remove it in 2016 which failed     - patient had been able to run up to this point    - At first it was just painful for a period of time but the pain would settle down but now the pain is persisting     - he is now having trouble walking     - he is going to see a trauma surgeon on Monday    - Cannot do cardio which helps his mental health     - Relationship has been one stable one year    - she continues to be very supportive and understanding    - 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     - Takes Klonipin every morning and PRN at night    - Mood has gone down but knows it is \"situational\" depression due to situation with leg    - has spoken with his therapist about this and about how stressful the last six months has been for him      - he feels \"burned out\"     - he is still is struggling with the loss of his brother     - He has been off of the Trintellix for a few months now      - In past he went back on Trintellix when other things were  not working (such as eating well, working out)     - Felt some of the side effects of the Trintellix included emotional stunting (hard to cry), not feeling things, having " trouble losing weight     - Wanted to feel what his baseline is off of the  medication and he is usually able to know when he needs to go back on the medication      - worried about his leg and what the options may be for treatment       - Patient denies SI, HI, manic or psychotic symptoms.        Psychiatric Review Of Systems:  Depressive Symptoms: negative  Manic Symptoms: negative  Anxiety Symptoms: reduced    Psychotic Symptoms: negative  Other Symptoms:     Current Medications:  Klonipin 1 mg PO BID PRN    Medical History:  Medical History   No past medical history on file.         Past Psychiatric History:   Diagnoses:   possible PTSD, anxiety, depression  Previous Psychiatrist:   Therapy:   Mika Laurent, specializes in nightmares/night time behavior - is on leave currently, he has appointment May 1, 2024  Current psychiatric medications: none   Past psychiatric medications: Cymbalta ok; SSRIs cannot take - gets more anxious; and very depressed    Past psychiatric treatments:  unknown  Hospitalizations:none  Suicide attempts: none  Family psychiatric history: unknown     Social History:   Currently lives:  [place], lives with self, has girlfriend who has kids,  8 months who is ; she does house cleaning; he tried online dating; kids ages 14, 11 and 8    Education:   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; [employer] , college [employer]    History of Learning Problem: none reported  Work/Finances:  car business for 6 years; ran summer camp; [employer]   Marital history/children: none  Current stressors:  step brother has cancer  Social support: girlfriend   Legal History: none   History: none  Family:   age 11; parents remarried   Siblings: blood brother age 39, 4 step sisters and 2 step brother;  end stage cancer step brother age 46 have 8 year old  History of violence: none reported     Substance Use History:  Tobacco use:  none  Use of alcohol:  unknown  Use of caffeine:  unknown  Use of other substances: unknown  Legal consequences of substance use: n/a  Substance use disorder treatment: n/a     Record Review: brief     Medical Review Of Systems:  Pertinent items are noted in HPI.     OARRS:  No data recorded  I have personally reviewed the OARRS report for Jaycob Caputo. I have considered the risks of abuse, dependence, addiction and diversion and I believe that it is clinically appropriate for Jaycob Caputo to be prescribed this medication     Is the patient prescribed a combination of a benzodiazepine and opioid?  No     Last Urine Drug Screen:           Lab on 06/10/2024   Component Date Value Ref Range Status    Amphetamine Screen, Urine 06/10/2024 Presumptive Negative  Presumptive Negative Final     CUTOFF LEVEL: 500 NG/ML   Cross-reactivity has been reported with high concentrations   of the following drugs: buproprion, chloroquine, chlorpromazine,   ephedrine, mephentermine, fenfluramine, phentermine,   phenylpropanolamine, pseudoephedrine, and propranolol.    Barbiturate Screen, Urine 06/10/2024 Presumptive Negative  Presumptive Negative Final     CUTOFF LEVEL: 200 NG/ML    Benzodiazepines Screen, Urine 06/10/2024 Presumptive Negative  Presumptive Negative Final     CUTOFF LEVEL: 200 NG/ML    Cannabinoid Screen, Urine 06/10/2024 Presumptive Positive (A)  Presumptive Negative Final     CUTOFF LEVEL: 50 NG/ML    Cocaine Metabolite Screen, Urine 06/10/2024 Presumptive Negative  Presumptive Negative Final     CUTOFF LEVEL: 150 NG/ML    Fentanyl Screen, Urine 06/10/2024 Presumptive Negative  Presumptive Negative Final     CUTOFF LEVEL: 5 NG/ML    Opiate Screen, Urine 06/10/2024 Presumptive Negative  Presumptive Negative Final     CUTOFF LEVEL: 300 NG/ML  The opiate screen does not detect fentanyl, meperidine, or   tramadol. Oxycodone is not consistently detected (refer to  Oxycodone Screen, Urine result).    Oxycodone  Screen, Urine 06/10/2024 Presumptive Negative  Presumptive Negative Final     CUTOFF LEVEL: 100 NG/ML  This test will accurately detect both oxycodone and oxymorphone.    PCP Screen, Urine 06/10/2024 Presumptive Negative  Presumptive Negative Final     CUTOFF LEVEL:  25 NG/ML  Cross-reactivity has been reported with dextromethorphan.    Methadone Screen, Urine 06/10/2024 Presumptive Negative  Presumptive Negative Final     CUTOFF LEVEL: 150 NG/ML  The metabolite L-alpha-acetylmethadol (LAAM) is not  detected by this method in concentrations that would  be found in the urine of patients on LAAM therapy.    60-Pmq-8-carboxy-THC, Urn, Quant 06/10/2024 60  ng/mL Final     Comment: INTERPRETIVE INFORMATION: THC Metabolite, Urine,                             Quantitative     Methodology: Quantitative Liquid Chromatography-Tandem Mass   Spectrometry  Positive cutoff: 15 ng/mL  For medical purposes only; not valid for forensic use.  The drug analyte detected in this assay, 9-carboxy THC, is a   metabolite of delta-9-tetrahydrocannabinol (THC). Detection of   9-carboxy THC suggests use of, or exposure to, a product   containing THC.  This test cannot distinguish between prescribed   or non-prescribed forms of THC, nor can it distinguish between   active or passive use. The 9-carboxy THC metabolite can be   detected in urine for several weeks. Normalization of results to   creatinine concentration can help document elimination or suggest   recent use, when specimens are collected at least one week apart.     This test was developed and its performance characteristics   determined by [facility]. It has not been cleared or   approved by the US Food and Drug                             Administration. This test was   performed in a CLIA certified laboratory and is intended for   clinical purposes.  Performed By: [facility]  [address] [place] [id number]  : Jaycob Rodney MD,  "PhD  ELEONORAIA Number: [id number]         Controlled Substance Agreement:  signed today      Activities of Daily Living:   Is your overall impression that this patient is benefiting (symptom reduction outweighs side effects) from benzodiazepine therapy? Yes        Objective   Mental Status Exam  Appearance: casually dressed, good g/h  Attitude: Calm, cooperative, and engaged in conversation.  Behavior: Appropriate eye contact.   Motor Activity: No psychomotor agitation or retardation. No abnormal movements, tremors or tics. No evidence of extrapyramidal symptoms or tardive dyskinesia.  Speech: Regular rate, rhythm, volume. Spontaneous, no pressured speech.  Mood: \"okay\"  Affect: Euthymic, full range, mood congruent.  Thought Process: Linear, logical, and goal-directed. No loose associations or gross thought disorganization.  Thought Content: Denied current suicidal ideation or thoughts of harm to self, denied homicidal ideation or thoughts of harm to others. No delusional thinking elicited. No perseverations or obsessions identified.   Perception: Did not endorse auditory or visual hallucinations, did not appear to be responding to hallucinatory stimuli.   Cognition: Alert, oriented x3. Preserved attention span and concentration, recent and remote memory. Adequate fund of knowledge. No deficits in language.   Insight: Fair, in regards to understanding mental health condition  Judgement: Fair              Vitals:     05/08/24 1020   BP: (!) 130/91   Pulse: 75   Resp: 16   Temp: 36.7 °C (98.1 °F)         Risk Assessment:     Risk of harm to self: low     Risk of harm to others: low      DXS:   Anxiety unspecified  MDD, recurrent     Assessment:  Patient is a 43 year old  male  with a history of depression and anxiety who is  doing well off of Trintellix.  He continues to take Klonipin PRN for sleep and  anxiety.  He feels he is very stable now does not need the medication at this time.       Patient denies SI, HI, manic or " psychotic symptoms.  No side effects reported.        Plan/Recommendations:  Medications: Trintellix has been discontinued; continue Klonipin 1 mg PO daily and PRN at bedtime anxiety/sleep  Orders: continue therapy  Follow up: 10-12 weeks  Call  Psychiatry at [phone] with issues.  For [facility], [employer] is a 24/7 hotline you can call for assistance [955.634.5690]. Please call [phone] or go to your closest Emergency Room if you feel unsafe. This includes thoughts of hurting yourself or anyone else, or having other troubles such as hearing voices, seeing visions, or having new and scary thoughts about the people around you.     Review with patient: Treatment plan reviewed with the patient.  Medication risks/benefit reviewed with the patient

## 2024-08-11 ASSESSMENT — ENCOUNTER SYMPTOMS
RESPIRATORY NEGATIVE: 1
PSYCHIATRIC NEGATIVE: 1
GASTROINTESTINAL NEGATIVE: 1
HEMATOLOGIC/LYMPHATIC NEGATIVE: 1
CONSTITUTIONAL NEGATIVE: 1
ALLERGIC/IMMUNOLOGIC NEGATIVE: 1
ARTHRALGIAS: 1
CARDIOVASCULAR NEGATIVE: 1
ENDOCRINE NEGATIVE: 1
EYES NEGATIVE: 1
NEUROLOGICAL NEGATIVE: 1

## 2024-08-12 ENCOUNTER — OFFICE VISIT (OUTPATIENT)
Dept: ORTHOPEDIC SURGERY | Facility: CLINIC | Age: 44
End: 2024-08-12
Payer: COMMERCIAL

## 2024-08-12 ENCOUNTER — HOSPITAL ENCOUNTER (OUTPATIENT)
Dept: RADIOLOGY | Facility: CLINIC | Age: 44
Discharge: HOME | End: 2024-08-12
Payer: COMMERCIAL

## 2024-08-12 DIAGNOSIS — S69.91XA RIGHT WRIST INJURY, INITIAL ENCOUNTER: ICD-10-CM

## 2024-08-12 DIAGNOSIS — Z87.81 HISTORY OF TIBIAL FRACTURE: ICD-10-CM

## 2024-08-12 PROCEDURE — 99213 OFFICE O/P EST LOW 20 MIN: CPT | Performed by: ORTHOPAEDIC SURGERY

## 2024-08-12 PROCEDURE — 73590 X-RAY EXAM OF LOWER LEG: CPT | Mod: LT

## 2024-08-12 PROCEDURE — 99203 OFFICE O/P NEW LOW 30 MIN: CPT | Performed by: ORTHOPAEDIC SURGERY

## 2024-08-12 PROCEDURE — 73560 X-RAY EXAM OF KNEE 1 OR 2: CPT | Mod: LEFT SIDE | Performed by: RADIOLOGY

## 2024-08-12 PROCEDURE — 73560 X-RAY EXAM OF KNEE 1 OR 2: CPT | Mod: LT

## 2024-08-12 PROCEDURE — 73590 X-RAY EXAM OF LOWER LEG: CPT | Mod: LEFT SIDE | Performed by: RADIOLOGY

## 2024-08-12 ASSESSMENT — PAIN - FUNCTIONAL ASSESSMENT: PAIN_FUNCTIONAL_ASSESSMENT: 0-10

## 2024-08-12 ASSESSMENT — PAIN SCALES - GENERAL: PAINLEVEL_OUTOF10: 6

## 2024-08-19 NOTE — PROGRESS NOTES
ORTHOPAEDIC HISTORY AND PHYSICAL    History Of Present Illness  Orthopaedic Problems/Injuries:  Jaycob Caputo is a 43 y.o. male presenting with tibia and ankle pain.  Patient reports he sustained tibial fracture on the left about 24 years ago with intramedullary nail placed.  Patient reported that an attempt was made to remove the tibial nail in 2015 by Dr. Ludwin Powell.  They are able to remove the interlock screws but the nail removal was unsuccessful.  He has had the nail since then.  He is complaining of pain about the medial aspect of the ankle that radiates to the foot.  He has this pain with running.  He has pain with pushing off.  He has no pain at rest.  Patient is concerned and wondering if he is developing a stress fracture around the nail because he was told that leaving the nail in place will cause stress riser.  He wanted to have this evaluated.      Review of Systems: 12 point ROS negative unless stated in HPI    Past Medical History  He has no past medical history on file.    Surgical History  He has no past surgical history on file.     Social History  He reports that he has never smoked. He has never been exposed to tobacco smoke. He has never used smokeless tobacco. He reports current alcohol use of about 4.0 - 5.0 standard drinks of alcohol per week. He reports current drug use. Drug: Marijuana.    Family History  Family History   Problem Relation Name Age of Onset    Skin cancer Mother      Hypertension Father      Skin cancer Father      Hypothyroidism Brother      Heart attack Paternal Grandfather          Allergies  Lamotrigine    Review of Systems     Physical Exam  Gen: The patient is alert and oriented ×3, is in no acute distress, and appear their stated age and weight.    Psychiatric: Mood and affect are appropriate.    Eyes: Sclera are white, and pupils are round and symmetric.    ENT: Mucous membranes are moist.     Neck: Supple. Thyroid is midline.    Respiratory: Respirations are  nonlabored, chest rise is symmetric.    Cardiac: Rate is regular by palpation of distal pulses.     Abdomen: Nondistended.    Integument: No obvious cutaneous lesions are noted. No signs of lymphangitis. No signs of systemic edema.    Examination of the left foot was performed.  The patient's gait and stance revealed asymmetrically mild pes planus and pronation.  There was mild swelling over the posterior tibialis tendon at the medial malleolus.  There were no other skin or lymph abnormalities.  As no other gross or obvious deformity.  On bilateral toe stance the left hindfoot remained in abnormal valgus, the right was in normal varus.  The patient was unable to perform single leg toe stance on the left side.  There was tenderness to palpation over the posterior tibialis tendon just distal to the medial malleolus.  There was absence of function of the posterior tibialis tendon. Ankle subtalar and midtarsal motion was full.  There was a positive Silfverskiold test.  The remaining neurovascular examination of the foot and ankle was intact.The neurological exam including motor and sensory exam was performed. The vascular examination including palpation of pulses and capillary refill of the foot was performed and determined to be intact.          Relevant Results  I personally reviewed his tibial radiograph and it reveals intramedullary nail in place.  No interlock screws.  No lucencies or fractures.  Assessment/Plan   Patient has an intramedullary nail in place.  Patient does not have any evidence of stress fracture.  No tenderness to palpation of the tibia.  However his exam is consistent with left early stage posterior tibialis tendinitis/dysfunction.  I discussed with him I did not believe that his tibial implant is causing his pain.  I referred him to foot and ankle specialist for evaluation of posterior tibial tendinitis/dysfunction.  I recommend rest elevation and using anti-inflammatories.  I recommend avoiding  running until he is evaluated by our foot and ankle specialist.

## 2024-08-28 ENCOUNTER — OFFICE VISIT (OUTPATIENT)
Dept: ORTHOPEDIC SURGERY | Facility: CLINIC | Age: 44
End: 2024-08-28
Payer: COMMERCIAL

## 2024-08-28 DIAGNOSIS — M76.822 LEFT TIBIALIS POSTERIOR TENDINITIS: Primary | ICD-10-CM

## 2024-08-28 PROCEDURE — 99213 OFFICE O/P EST LOW 20 MIN: CPT | Performed by: STUDENT IN AN ORGANIZED HEALTH CARE EDUCATION/TRAINING PROGRAM

## 2024-08-28 RX ORDER — MELOXICAM 7.5 MG/1
7.5 TABLET ORAL DAILY
Qty: 7 TABLET | Refills: 0 | Status: SHIPPED | OUTPATIENT
Start: 2024-08-28 | End: 2024-09-04

## 2024-08-28 ASSESSMENT — PAIN DESCRIPTION - DESCRIPTORS: DESCRIPTORS: ACHING;BURNING;SHARP;SHOOTING

## 2024-08-28 ASSESSMENT — PAIN SCALES - GENERAL: PAINLEVEL_OUTOF10: 6

## 2024-08-28 ASSESSMENT — PAIN - FUNCTIONAL ASSESSMENT: PAIN_FUNCTIONAL_ASSESSMENT: 0-10

## 2024-08-28 NOTE — PROGRESS NOTES
ORTHOPAEDIC SURGERY OUTPATIENT PROGRESS NOTE    Chief Complaint:  Left ankle pain    History Of Present Illness  Jaycob Caputo is a 43 y.o. male s/p L Tibia IMN EILEEN (screws only) 2016 with Dr. Erickson who presents for evaluation of left ankle pain as a referral from Dr. Mayberry.  Patient has a remote history of a left tib-fib fracture from 2000.  Patient reports difficulty returning to running when the weather improves.  Patient reports that this is typical for him but his symptoms usually resolve.  Pain is described as 6 out of 10 and associated with stabbing and radiation.  This has been present from May and is unchanged.  Patient reports no interval history of trauma.  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  Symptoms are typically worse the day following a longer workday.  Patient otherwise remains in his usual state of health.  He was apparently informed that he would develop arthritis subsequent to his surgery from 2000.  He has not had any formal HEP, PT or CSI.  He has not been using any braces, orthotics or inserts in his shoes.     Past Medical History  No past medical history on file.    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   Tobacco Use    Smoking status: Never     Passive exposure: Never    Smokeless tobacco: Never   Vaping Use    Vaping status: Never Used   Substance and Sexual Activity    Alcohol use: Yes     Alcohol/week: 4.0 - 5.0 standard drinks of alcohol     Types: 4 - 5 Standard drinks or equivalent per week    Drug use: Yes     Types: Marijuana     Comment: occasional    Sexual activity: Yes     Partners: Female        Family History  Family History   Problem Relation Name Age of Onset    Skin cancer Mother      Hypertension Father      Skin cancer Father      Hypothyroidism Brother      Heart attack Paternal Grandfather          Allergies  Allergies   Allergen Reactions    Lamotrigine Rash       Review of Systems  REVIEW OF SYSTEMS  Constitutional: no unplanned weight loss  Psychiatric: no suicidal ideation  ENT: no vision changes, no sinus problems  Pulmonary: no shortness of breath  Lymphatic: no enlarged lymph nodes  Cardiovascular: no chest pain or shortness of breath  Gastrointestinal: no stomach problems  Genitourinary: no dysuria   Skin: no rashes  Endocrine: no thyroid problems  Neurological: no headache, no numbness  Hematological: no easy bruising  Musculoskeletal: Left ankle pain     Physical Exam  PHYSICAL EXAMINATION  Constitutional Exam: well developed and well nourished  Psychiatric Exam: alert and oriented, appropriate mood and behavior  Eye Exam: EOMI  Pulmonary Exam: breathing non-labored, no apparent distress  Lymphatic exam: no appreciable lymphadenopathy in the lower extremities  Cardiovascular exam: RRR to peripheral palpation, DP pulses 2+, PT 2+, toes are pink with good capillary refill, no pitting edema  Skin exam: no open lesions, rashes, abrasions or ulcerations  Neurological exam: sensation to light touch intact in both lower extremities in peripheral and dermatomal distributions (except for any abnormalities noted in musculoskeletal exam)    Musculoskeletal exam: Left lower extremity semination.  Patient pain localized to the posterior medial ankle.  He is tender to palpation along the PTT.  Well-healed patella incision.  Negative Tinel's at the posterior tarsal tunnel.  Patient has physiologic hindfoot valgus with relatively neutral arch posture and no significant forefoot deformity noted.  Patient has relatively pain-free and supple ankle, subtalar and midtarsal joint range of motion.  There  is no crepitus.  Patient has sensation intact to light touch grossly in a saphenous, sural, superficial peroneal, deep peroneal and tibial nerve distribution.  He has 5 out of 5 strength in plantarflexion, dorsiflexion and EHL.  He has pain with resisted inversion but no pain with resisted eversion.  Patient has 2+ DP/PT pulse palpated.  He is a negative Tinel's at the saphenous nerve.  No significant pain with dorsiflexion external rotation, negative anterior medial drawer test.  Patient is unable to perform an unassisted single-leg heel raise on the left, recreating his ambulatory pain.     Last Recorded Vitals  There were no vitals taken for this visit.    Laboratory Results  No results found for this or any previous visit (from the past 24 hour(s)).     Radiology Results  X-ray imaging nonweightbearing left tib-fib reviewed from 08/12/2024 and independently evaluated by me on 08/28/2024 demonstrates no acute fracture or dislocation.  There is residual IMN tibia with screw removal, no obvious stress fracture.  Ankle mortise appears well aligned.    Assessment/Plan:  43-year-old male who in my impression has left ankle pain likely secondary to posterior tibialis tendinitis.  I have reviewed the diagnosis and treatment options extensively with the patient.  I would recommend the patient continue weightbearing to his tolerance in his left lower extremity.  I will provide him with information regarding a lace up style ankle brace and a formal referral to physical therapy for PTT stretching and strengthening.  I will provide him with a short course of meloxicam and have counseled the patient regarding activity modification so as to avoid overuse. He will continue with a topical anti-inflammatory cream for symptomatic relief. I am unsure at this point if his pain will be completely relieved by treatment of his PTT but have limited expectation that the retained tibia IMN is continuing to contribute to his on-going pain  as per Dr. Mayberry. I will plan to see the patient back in approximately 6 weeks to follow his clinical course.  If the patient not clinically improving would likely obtain MRI left ankle without contrast to more completely evaluate his PTT.  Upon return, patient would require three-view weightbearing left ankle.    Please provide a copy of this encounter to Dr. Mayberry.    Phan Ashley MD, SHERIDAN  Department of Orthopaedic Surgery  [facility]    The diagnosis and treatment plan were reviewed with the patient. All questions were answered. The patient verbalized understanding of the treatment plan. There were no barriers to understanding identified.    Note dictated with [employer] software.  Completed without full type editing and sent to avoid delay.

## 2024-09-09 ENCOUNTER — APPOINTMENT (OUTPATIENT)
Dept: ORTHOPEDIC SURGERY | Facility: HOSPITAL | Age: 44
End: 2024-09-09
Payer: COMMERCIAL

## 2024-09-25 ENCOUNTER — APPOINTMENT (OUTPATIENT)
Dept: BEHAVIORAL HEALTH | Facility: CLINIC | Age: 44
End: 2024-09-25
Payer: COMMERCIAL

## 2024-09-25 DIAGNOSIS — F41.9 ANXIETY: ICD-10-CM

## 2024-09-25 PROCEDURE — 99213 OFFICE O/P EST LOW 20 MIN: CPT | Performed by: PSYCHIATRY & NEUROLOGY

## 2024-09-25 NOTE — PROGRESS NOTES
Last appointment 8/7/2024 with this provider    Subjective   Jaycob Caputo, a 43 y.o. male, presenting to Psychiatry for evaluation.  Patient is referred by Lopez Burton MD.         Virtual or Telephone Consent     An interactive audio and video telecommunication system which permits real time communications between the patient (at the originating site) and provider (at the distant site) was utilized to provide this telehealth service.   Verbal consent was requested and obtained from Jaycob Caputo on this date, 09/25/24 for a telehealth visit.       Patient said he is doing okay.  The last year has  been very rough for him at work.  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  He said no one is buying cars either used or new.  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  Interest rates are high and the economy is not strong right now.    He is doing [employer] again which he does not like but it gives him a lot of flexibility.  He has been under a lot of stress and does not know what to do about work   He is meeting with a temp agency   GF lives an hour away and does not want to sacrifice time with her  Bosses wedding a month from today in [place] and does not want to quit before then  Working with therapist and family about what to do   Met with his therapist  an extra time this week   Also pulled tendon in leg so he cannot go to gym  Will have PT in early Oct   Went to surgeon and was referred to different doctor - does not have to have surgery  Still processing loss of brother  Parents getting older  Klonipin twice a day - one in am   Has some anxiety and depression mostly from situation at work but feels he is managing well  Feels it is situational depression   Does not feel he needs the antidepressant at this time  Patient denies SI, HI, manic or psychotic symptoms.     Psychiatric Review Of Systems:  Depressive Symptoms: negative  Manic Symptoms: negative  Anxiety Symptoms: reduced     Psychotic Symptoms: negative  Other Symptoms:     Current Medications:  Klonipin 1 mg PO BID PRN    Medical History:  Medical History   No past medical history on file.         Past Psychiatric History:   Diagnoses:   possible PTSD, anxiety, depression  Previous Psychiatrist:   Therapy:   Mika Laurent, specializes in nightmares/night time behavior - is on leave currently, he has appointment May 1, 2024  Current psychiatric medications: none   Past psychiatric medications: Cymbalta ok; SSRIs cannot take - gets more anxious; and very depressed    Past psychiatric treatments:  unknown  Hospitalizations:none  Suicide attempts: none  Family psychiatric history: unknown     Social History:   Currently lives:  [place], lives with self, has girlfriend who has kids,  8 months who is ; she does house cleaning; he tried online dating; kids ages 14, 11 and 8    Education:   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; [employer] , [employer]    History of Learning Problem: none reported  Work/Finances:  car business for 6 years; ran summer camp; [employer]; 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;    Marital history/children: none  Current stressors:  step brother passed away from has cancer  Social support: girlfriend and family   Legal History: none   History: none  Family:   age 11; parents remarried   Siblings: blood brother age 39, 4 step sisters and 2 step brother;  end stage cancer step brother age 46 have 8 year old  History of violence: none reported     Substance Use History:  Tobacco use: none  Use of alcohol:  unknown  Use of caffeine:  unknown  Use of other substances: unknown  Legal consequences of substance use: n/a  Substance use disorder treatment: n/a     Record Review: brief     Medical Review Of Systems:  Pertinent items are noted in HPI.     OARRS:  No data recorded  I have personally reviewed the OARRS  report for Jaycob Caputo. I have considered the risks of abuse, dependence, addiction and diversion and I believe that it is clinically appropriate for Jaycob Caputo to be prescribed this medication     Is the patient prescribed a combination of a benzodiazepine and opioid?  No     Last Urine Drug Screen:                 Lab on 06/10/2024   Component Date Value Ref Range Status    Amphetamine Screen, Urine 06/10/2024 Presumptive Negative  Presumptive Negative Final     CUTOFF LEVEL: 500 NG/ML   Cross-reactivity has been reported with high concentrations   of the following drugs: buproprion, chloroquine, chlorpromazine,   ephedrine, mephentermine, fenfluramine, phentermine,   phenylpropanolamine, pseudoephedrine, and propranolol.    Barbiturate Screen, Urine 06/10/2024 Presumptive Negative  Presumptive Negative Final     CUTOFF LEVEL: 200 NG/ML    Benzodiazepines Screen, Urine 06/10/2024 Presumptive Negative  Presumptive Negative Final     CUTOFF LEVEL: 200 NG/ML    Cannabinoid Screen, Urine 06/10/2024 Presumptive Positive (A)  Presumptive Negative Final     CUTOFF LEVEL: 50 NG/ML    Cocaine Metabolite Screen, Urine 06/10/2024 Presumptive Negative  Presumptive Negative Final     CUTOFF LEVEL: 150 NG/ML    Fentanyl Screen, Urine 06/10/2024 Presumptive Negative  Presumptive Negative Final     CUTOFF LEVEL: 5 NG/ML    Opiate Screen, Urine 06/10/2024 Presumptive Negative  Presumptive Negative Final     CUTOFF LEVEL: 300 NG/ML  The opiate screen does not detect fentanyl, meperidine, or   tramadol. Oxycodone is not consistently detected (refer to  Oxycodone Screen, Urine result).    Oxycodone Screen, Urine 06/10/2024 Presumptive Negative  Presumptive Negative Final     CUTOFF LEVEL: 100 NG/ML  This test will accurately detect both oxycodone and oxymorphone.    PCP Screen, Urine 06/10/2024 Presumptive Negative  Presumptive Negative Final     CUTOFF LEVEL:  25 NG/ML  Cross-reactivity has been reported with  dextromethorphan.    Methadone Screen, Urine 06/10/2024 Presumptive Negative  Presumptive Negative Final     CUTOFF LEVEL: 150 NG/ML  The metabolite L-alpha-acetylmethadol (LAAM) is not  detected by this method in concentrations that would  be found in the urine of patients on LAAM therapy.    31-Khc-0-carboxy-THC, Urn, Quant 06/10/2024 60  ng/mL Final     Comment: INTERPRETIVE INFORMATION: THC Metabolite, Urine,                             Quantitative     Methodology: Quantitative Liquid Chromatography-Tandem Mass   Spectrometry  Positive cutoff: 15 ng/mL  For medical purposes only; not valid for forensic use.  The drug analyte detected in this assay, 9-carboxy THC, is a   metabolite of delta-9-tetrahydrocannabinol (THC). Detection of   9-carboxy THC suggests use of, or exposure to, a product   containing THC.  This test cannot distinguish between prescribed   or non-prescribed forms of THC, nor can it distinguish between   active or passive use. The 9-carboxy THC metabolite can be   detected in urine for several weeks. Normalization of results to   creatinine concentration can help document elimination or suggest   recent use, when specimens are collected at least one week apart.     This test was developed and its performance characteristics   determined by [facility]. It has not been cleared or   approved by the US Food and Drug                             Administration. This test was   performed in a CLIA certified laboratory and is intended for   clinical purposes.  Performed By: [facility]  [address] [place] [id number]  : Jaycob Rodney MD, PhD  CLIA Number: [id number]         Controlled Substance Agreement:  signed today      Activities of Daily Living:   Is your overall impression that this patient is benefiting (symptom reduction outweighs side effects) from benzodiazepine therapy? Yes        Objective   Mental Status Exam  Appearance: casually dressed,  "good g/h  Attitude: Calm, cooperative, and engaged in conversation.  Behavior: Appropriate eye contact.   Motor Activity: No psychomotor agitation or retardation. No abnormal movements, tremors or tics. No evidence of extrapyramidal symptoms or tardive dyskinesia.  Speech: Regular rate, rhythm, volume. Spontaneous, no pressured speech.  Mood: \"okay\"  Affect: Euthymic, full range, mood congruent.  Thought Process: Linear, logical, and goal-directed. No loose associations or gross thought disorganization.  Thought Content: Denied current suicidal ideation or thoughts of harm to self, denied homicidal ideation or thoughts of harm to others. No delusional thinking elicited. No perseverations or obsessions identified.   Perception: Did not endorse auditory or visual hallucinations, did not appear to be responding to hallucinatory stimuli.   Cognition: Alert, oriented x3. Preserved attention span and concentration, recent and remote memory. Adequate fund of knowledge. No deficits in language.   Insight: Fair, in regards to understanding mental health condition  Judgement: Fair              Vitals:     05/08/24 1020   BP: (!) 130/91   Pulse: 75   Resp: 16   Temp: 36.7 °C (98.1 °F)         Risk Assessment:     Risk of harm to self: low     Risk of harm to others: low      DXS:   Anxiety unspecified  MDD, recurrent     Assessment:  Patient is a 43 year old  male  with a history of depression and anxiety who is  doing well off of Trintellix.  He continues to take Klonipin PRN for sleep and  anxiety.  He feels he is very stable now does not need the medication at this time.    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     Patient denies SI, HI, manic or psychotic symptoms.  No side effects reported.        Plan/Recommendations:  Medications: Klonipin 1 mg PO daily and PRN at bedtime anxiety/sleep  Orders: continue therapy  Follow up: 10-12 " weeks  Call  Psychiatry at [phone] with issues.  For [facility] residents, Mobile Crisis is a 24/7 hotline you can call for assistance [[phone]]. Please call [phone] or go to your closest Emergency Room if you feel unsafe. This includes thoughts of hurting yourself or anyone else, or having other troubles such as hearing voices, seeing visions, or having new and scary thoughts about the people around you.     Review with patient: Treatment plan reviewed with the patient.  Medication risks/benefit reviewed with the patient

## 2024-10-09 ENCOUNTER — EVALUATION (OUTPATIENT)
Dept: PHYSICAL THERAPY | Facility: CLINIC | Age: 44
End: 2024-10-09
Payer: COMMERCIAL

## 2024-10-09 ENCOUNTER — OFFICE VISIT (OUTPATIENT)
Dept: ORTHOPEDIC SURGERY | Facility: CLINIC | Age: 44
End: 2024-10-09
Payer: COMMERCIAL

## 2024-10-09 ENCOUNTER — HOSPITAL ENCOUNTER (OUTPATIENT)
Dept: RADIOLOGY | Facility: CLINIC | Age: 44
Discharge: HOME | End: 2024-10-09
Payer: COMMERCIAL

## 2024-10-09 DIAGNOSIS — M76.822 LEFT TIBIALIS POSTERIOR TENDINITIS: ICD-10-CM

## 2024-10-09 PROCEDURE — 97161 PT EVAL LOW COMPLEX 20 MIN: CPT | Mod: GP

## 2024-10-09 PROCEDURE — 97110 THERAPEUTIC EXERCISES: CPT | Mod: GP

## 2024-10-09 PROCEDURE — 73610 X-RAY EXAM OF ANKLE: CPT | Mod: LEFT SIDE | Performed by: RADIOLOGY

## 2024-10-09 PROCEDURE — 73610 X-RAY EXAM OF ANKLE: CPT | Mod: LT

## 2024-10-09 PROCEDURE — 99213 OFFICE O/P EST LOW 20 MIN: CPT | Performed by: STUDENT IN AN ORGANIZED HEALTH CARE EDUCATION/TRAINING PROGRAM

## 2024-10-09 PROCEDURE — 97535 SELF CARE MNGMENT TRAINING: CPT | Mod: GP

## 2024-10-09 ASSESSMENT — ENCOUNTER SYMPTOMS
OCCASIONAL FEELINGS OF UNSTEADINESS: 1
LOSS OF SENSATION IN FEET: 0
DEPRESSION: 1

## 2024-10-09 ASSESSMENT — PAIN SCALES - GENERAL: PAINLEVEL_OUTOF10: 1

## 2024-10-09 ASSESSMENT — PAIN DESCRIPTION - DESCRIPTORS: DESCRIPTORS: ACHING;SORE

## 2024-10-09 ASSESSMENT — PAIN - FUNCTIONAL ASSESSMENT: PAIN_FUNCTIONAL_ASSESSMENT: 0-10

## 2024-10-09 NOTE — PROGRESS NOTES
ORTHOPAEDIC SURGERY OUTPATIENT PROGRESS NOTE    Chief Complaint:  Left ankle pain    History Of Present Illness  Jaycob Caputo is a 43 y.o. male s/p L Tibia IMN EILE[place] (screws only) 2016 with Dr. Erickson who presents for evaluation of left ankle pain as a referral from Dr. Mayberry.  Patient has a remote history of a left tib-fib fracture from 2000.  Patient reports difficulty returning to running when the weather improves.  Patient reports that this is typical for him but his symptoms usually resolve.  Pain is described as 6 out of 10 and associated with stabbing and radiation.  This has been present from May and is unchanged.  Patient reports no interval history of trauma.  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  Symptoms are typically worse the day following a longer workday.  Patient otherwise remains in his usual state of health.  He was apparently informed that he would develop arthritis subsequent to his surgery from 2000.  He has not had any formal HEP, PT or CSI.  He has not been using any braces, orthotics or inserts in his shoes.    10/09/2024: Patient returns for follow-up of his left foot pain as above.  Current pain is reported as 1 out of 10.  He began physical therapy as of today.  He had a 2-week period since our last follow-up without any pain at all.  He does notice intermittent continued knee pain as well as pain that he attributes to the nail.     Past Medical History  No past medical history on file.    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   Tobacco Use    Smoking status: Never     Passive exposure: Never    Smokeless  tobacco: Never   Vaping Use    Vaping status: Never Used   Substance and Sexual Activity    Alcohol use: Yes     Alcohol/week: 4.0 - 5.0 standard drinks of alcohol     Types: 4 - 5 Standard drinks or equivalent per week    Drug use: Yes     Types: Marijuana     Comment: occasional    Sexual activity: Yes     Partners: Female       Family History  Family History   Problem Relation Name Age of Onset    Skin cancer Mother      Hypertension Father      Skin cancer Father      Hypothyroidism Brother      Heart attack Paternal Grandfather          Allergies  Allergies   Allergen Reactions    Lamotrigine Rash       Review of Systems  REVIEW OF SYSTEMS  Constitutional: no unplanned weight loss  Psychiatric: no suicidal ideation  ENT: no vision changes, no sinus problems  Pulmonary: no shortness of breath  Lymphatic: no enlarged lymph nodes  Cardiovascular: no chest pain or shortness of breath  Gastrointestinal: no stomach problems  Genitourinary: no dysuria   Skin: no rashes  Endocrine: no thyroid problems  Neurological: no headache, no numbness  Hematological: no easy bruising  Musculoskeletal: Left ankle pain     Physical Exam  PHYSICAL EXAMINATION  Constitutional Exam: well developed and well nourished  Psychiatric Exam: alert and oriented, appropriate mood and behavior  Eye Exam: EOMI  Pulmonary Exam: breathing non-labored, no apparent distress  Lymphatic exam: no appreciable lymphadenopathy in the lower extremities  Cardiovascular exam: RRR to peripheral palpation, DP pulses 2+, PT 2+, toes are pink with good capillary refill, no pitting edema  Skin exam: no open lesions, rashes, abrasions or ulcerations  Neurological exam: sensation to light touch intact in both lower extremities in peripheral and dermatomal distributions (except for any abnormalities noted in musculoskeletal exam)    Musculoskeletal exam: Left lower extremity semination.  Patient previous pain localized to the posterior medial ankle, he has minimal  tenderness to palpation along the PTT on examination today.  He continues with a negative Tinel at the posterior tarsal tunnel. Patient has physiologic hindfoot valgus with relatively neutral arch posture and no significant forefoot deformity noted.  Patient has relatively pain-free and supple ankle, subtalar and midtarsal joint range of motion.  There is no crepitus.  Patient has sensation intact to light touch grossly in a saphenous, sural, superficial peroneal, deep peroneal and tibial nerve distribution.  He has 5 out of 5 strength in plantarflexion, dorsiflexion and EHL.  Patient has 2+ DP/PT pulse palpated.  He is a negative Tinel's at the saphenous nerve.  No significant pain with dorsiflexion external rotation, negative anterior medial drawer test.      Last Recorded Vitals  There were no vitals taken for this visit.    Laboratory Results  No results found for this or any previous visit (from the past 24 hour(s)).     Radiology Results  X-ray imaging 3 view weightbearing left ankle reviewed from 10/09/2024 and independently evaluated by me demonstrates well aligned ankle mortise, no obvious decreased joint space of the tibiotalar joint.    Assessment/Plan:   43-year-old male who in my impression has left ankle pain likely secondary to posterior tibialis tendinitis.  I have reviewed the diagnosis and treatment options extensively with the patient.  I would recommend the patient continue weightbearing to his tolerance in his left lower extremity.  He has had interval improvement with a brief period of rest and anti-inflammatories.  He should continue with his formal physical therapy course at this point.  I would intend to see the patient back in 6 weeks to follow his clinical course.  If the patient not clinically improving would consider MRI left ankle without contrast and metal subtraction to more completely evaluate his PTT.  Upon return, patient would not require further imaging.    Phan Ashley,  SHERIDAN CANO  Department of Orthopaedic Surgery  [facility]    The diagnosis and treatment plan were reviewed with the patient. All questions were answered. The patient verbalized understanding of the treatment plan. There were no barriers to understanding identified.    Note dictated with [employer] software.  Completed without full type editing and sent to avoid delay.

## 2024-10-09 NOTE — PROGRESS NOTES
Physical Therapy  Physical Therapy Orthopedic Evaluation    Patient Name: Jaycob Caputo  MRN: [id number]  Today's Date: 10/9/2024  Time Calculation  Start Time: 1232  Stop Time: 1324  Time Calculation (min): 52 min    Insurance:  Visit number: 1 of 40  Authorization info: No Auth  Insurance Type: MMO SuperMed, Availity  Onset date: 7/1/2024    General:  Reason for visit: Posterior tibialis tendinitis   Referred by: Phan Ashley MD    Current Problem:  1. Left tibialis posterior tendinitis  Referral to Physical Therapy    Follow Up In Physical Therapy          Precautions:   Precautions  STEADI Fall Risk Score (The score of 4 or more indicates an increased risk of falling): 4      Medical History Form: Reviewed (scanned into chart)    Subjective:   Subjective   Chief Complaint: Patient presents to clinic with report of left lower leg and medial ankle pain.  Pain started in July 2024 when he ran 2-3 miles on the pavement.  Tried to push through and continue with running and use of the elliptical.  Pain did not go away, and even became painful when he was not running. Over the past 6 weeks, he has been resting it, using a brace, and Voltaren cream.  Pain has lessened but still feels sore.     History of L Tibial Fracture in 2006 with IMN [place] placement. Screws were removed in 2016, but not the IMN nail.  Has had some persistence of bone pain if he runs too much.      Onset Date: 7/1/2024  SARAY: Running    Current Condition:   Better    Pain:  Pain level currently: 0/10  Pain level at worst: 7/10 initially, 3/10 recently  Pain level at best: 0/10  Location: Left medial tibia and ankle  Description: Ache, previous swelling  Aggravating Factors: Prolonged standing, walking >20 minutes, push-off, running, steps, sit-stand, first few steps in the morning  Relieving Factors: Brace, Voltaren    Relevant Information:  PMH: Tibial Fracture with IMN [place] 2016, L knee OA, Anxiety and Depression  Previous Tests/Imaging:  "XR Tibia, Fibula  Previous Interventions/Treatments: Meloxicam    Prior Level of Function (PLOF)  Patient previously independent with all ADLs. Currently at 70% of PLOF.  Functional limitations: Running, working out  Exercise/Physical Activity: Prefers to run (typically 30 minutes) 4-5x/week.  Not currently working out  Work/School: Car Sales     Patients Living Environment: Reviewed and no concern    Primary Language: English    Patient's Goal(s) for Therapy: Get rid of the pain, Be able to run    Red Flags: Do you have any of the following? No  Fever/chills, unexplained weight changes, dizziness/fainting, unexplained change in bowel or bladder functions, unexplained malaise or muscle weakness, night pain/sweats, numbness or tingling      Objective:  Objective     Posture: Pes planus R>L, Mild toeing out R> L    Gait: Stable without assistive device    Palpation: Tenderness along course of Left PTT        ROM      Knee AROM (Degrees)       (R)  (L)  Flexion:  141  139      Extension:  0  0            Ankle AROM (Degrees)       (R)  (L)  Plantarflexion:  51  49      Dorsiflexion:  12  5     Inversion:  47  49, minor pain      Eversion:  20  17               Strength Testing        Knee     (R)  (L)  Flexion:  5  5      Extension:  5  5         Ankle     (R)  (L)  Plantarflexion:  5  4      Dorsiflexion:  5  5     Inversion:  5  5      Eversion:  5  5     Unilateral Heel Raises: 25 reps 14 reps, sore during, pain afterwards      Flexibility       (R)  (L)  Hamstrings:  -40  -45  Hip Flexors:  Mild hendrickson Mild hendrickson  Quadriceps:  7\"  7\"  Gastrocsoleus:  5  10       Functional Movement  Sit to stand: WNL, mild pain L PTT region  Squat: Mild reduction in depth, pain upon returning to neutral  Single-Leg Quarter Squat: Reduced depth, PFJ crepitus, femoral rotation on L         Balance    Single Leg Balance:   (R)>20\"  (L)>20  Single Leg Balance EC: (R)6\"   (L)12\", tibial rotation         Ankle Joint Mobility: Mild " hypomobility bilaterally         Special Tests  Ligament Tests:   Anterior Drawer Test: (-) L   Talar Tilt:  (-) L  Navicular Drop: (+) R and L      Outcome Measures:  Other Measures  Lower Extremity Funtional Score (LEFS): 46/80       Goals: Set and discussed today  Active       PT Problem       PT Goal 1       Start:  10/09/24    Expected End:  11/06/24       Ankle DF ROM WNL.  L ankle plantar flexion strength measures improved to 5/5.  Maintain SLS with EC for at least 10 seconds with good control.   Walk 30 minutes without pain and without limping.  Reciprocal ascent/descent of stairs without pain.  Squat with good dynamic valgus control and without pain.           PT Goal 2       Start:  10/09/24    Expected End:  12/04/24       LE flexibility limitations reduced to minimal limitation for improved functional mobility.  Run 1 mile without pain or swelling.   LEFS improved by at least 12 points.   Patient will rate pain < 2/10 at worst to improve activity tolerance.  Independent with home exercise program for symptom reduction and functional gains by week 6.              Plan of care was developed with input and agreement by the patient    Treatments:     Self Care:    10 min  Education: PTT anatomy, injury pathology, plan of care, role of eccentric training in tendon healing, home exercise program, activity modifications, and self STM to posterior tibialis.      Therapeutic Exercise:    15 min  Instructed in initial home exercise program (Handout provided)    Personalized Home Exercise Program:   Access Code: [email or web address]  URL: [email or web address]  Date: 10/09/2024  Prepared by: Desire Dickey    Exercises  - Long Sitting Calf Stretch with Strap  - 2 x daily - 7 x weekly - 3 reps - 30 hold  - Supine Hamstring Stretch with Strap  - 2 x daily - 7 x weekly - 3 reps - 30 seconds hold  - Long Sitting Eccentric Ankle Plantar Flexion with Resistance  - 1 x daily - 7 x weekly - 3 sets - 10  reps  - Long Sitting Ankle Inversion with Anchored Resistance  - 1 x daily - 7 x weekly - 3 sets - 10 reps  - Standing Heel Raise  - 1 x daily - 7 x weekly - 3 sets - 10 reps  - Single Leg Stance  - 1 x daily - 7 x weekly - 3 reps - 60 hold  - Standing Gastroc Stretch  - 2 x daily - 7 x weekly - 3 reps - 30 hold  - Standing Soleus Stretch  - 2 x daily - 7 x weekly - 3 reps - 30 seconds hold    Charges: Eval, TE x 1, Self-Care x 1    Assessment: Patient presents to PT with chief complaint of left medial ankle pain consistent with posterior tibial tendinitis which has resulted in difficulty with walking, running, stairs, sit-stand, and performing his usual work-out routine.  Upon examination, patient demonstrates mild pes planus, decreased flexibility in gastroc-soleus, HS, quadriceps and hip flexors.  He has reduced plantar flexion strength, ankle DF ROM, and static and dynamic control L vs R.  Patient would benefit from course of PT to address these impairments, reduce pain, and assist patient in returning to [facility].         Clinical Presentation: Stable and/or uncomplicated characteristics  Level of Complexity: Low    Plan:     Planned Interventions include: therapeutic exercise, self-care home management, manual therapy, therapeutic activities, gait training, neuromuscular coordination, vasopneumatic, dry needling, aquatic therapy  Frequency: 1-2 x Week  Duration: 8 Weeks  Rehab Potential/Prognosis: Dre Dickey, PT    Nursing home

## 2024-10-14 NOTE — PROGRESS NOTES
Physical Therapy  Physical Therapy Treatment Note    Patient Name: Jaycob Caputo  MRN: [id number]  Today's Date: 10/16/2024  Time Calculation  Start Time: 1227  Stop Time: 1317  Time Calculation (min): 50 min    Insurance:  Visit number: 2 of 40  Authorization info: No Auth  Insurance Type: MMO SuperMed, Availity  Onset date: 7/1/2024     General:  Reason for visit: Posterior tibialis tendinitis   Referred by: Phan Ashley MD    Current Problem  1. Left tibialis posterior tendinitis  Follow Up In Physical Therapy          Precautions: STEADI Fall Risk Score (The score of 4 or more indicates an increased risk of falling): 4       Subjective:     Patient reports that the soreness did get up to pain with doing heel raises (performed unilaterally), so he has been cautious with proceeding through routine.      Pain  0-10 (Numeric) Pain Score: 2; Max 4/10 briefly yesterday when he twisted the leg while at work.     Performing HEP?: Yes      Objective:   Posture: Pes planus R>L, Mild toeing out R> L     Gait: Stable without assistive device     Palpation: Tenderness along course of Left PTT                Treatment Performed:    Therapeutic Exercise:    37 min  Upright bike L3 6'  Incline calf stretch 1'x2  Bilateral heel raises with slow eccentric lowering 2x10  Inchworms blue t-band 20' x 2  Squat with hip abduction, blue Tband loop 3x10  Resisted inversion, eversion, and dorsiflexion green tband 2x20  Supine HS stretch with strap 1' L  Long-sitting gastroc stretch with strap 1'  Prone quad stretch with strap 1'    Manual Therapy:    10 min min  STM along course of PTT L     Neuromuscular Re-education:  3 min  SLS with 4kg KB swing 10x cw/ccw      Personalized Home Exercise Program:  Access Code: [email or web address]  URL: [email or web address]  Date: 10/09/2024  Prepared by: Desire Dickey     Exercises  - Long Sitting Calf Stretch with Strap  - 2 x daily - 7 x weekly - 3 reps - 30 hold  -  Supine Hamstring Stretch with Strap  - 2 x daily - 7 x weekly - 3 reps - 30 seconds hold  - Long Sitting Eccentric Ankle Plantar Flexion with Resistance  - 1 x daily - 7 x weekly - 3 sets - 10 reps  - Long Sitting Ankle Inversion with Anchored Resistance  - 1 x daily - 7 x weekly - 3 sets - 10 reps  - Standing Heel Raise  - 1 x daily - 7 x weekly - 3 sets - 10 reps  - Single Leg Stance  - 1 x daily - 7 x weekly - 3 reps - 60 hold  - Standing Gastroc Stretch  - 2 x daily - 7 x weekly - 3 reps - 30 hold  - Standing Soleus Stretch  - 2 x daily - 7 x weekly - 3 reps - 30 seconds hold     Charges: Man x 1, TE x 2    Assessment:   Jaycob initiated heel raises unilaterally instead of bilaterally while performing his HEP which induced pain.  Encouraged to perform them bilaterally as advised.  Did ok with the exercises performed today, and the soreness did not exceed 2/10.  Notable tenderness along course of PTT.        Plan:  Continue with gastrocsoleus flexibility, hip/core and lower leg strengthening, and manual therapy to reduce pain and improve function.  Guide in return to running program (starting with walk-jog)  when pain with ADLs and walking has reduced considerably.        Desire Dickey, PT

## 2024-10-16 ENCOUNTER — TREATMENT (OUTPATIENT)
Dept: PHYSICAL THERAPY | Facility: CLINIC | Age: 44
End: 2024-10-16
Payer: COMMERCIAL

## 2024-10-16 DIAGNOSIS — M76.822 LEFT TIBIALIS POSTERIOR TENDINITIS: ICD-10-CM

## 2024-10-16 PROCEDURE — 97140 MANUAL THERAPY 1/> REGIONS: CPT | Mod: GP

## 2024-10-16 PROCEDURE — 97110 THERAPEUTIC EXERCISES: CPT | Mod: GP

## 2024-10-16 ASSESSMENT — PAIN SCALES - GENERAL: PAINLEVEL_OUTOF10: 2

## 2024-10-21 ENCOUNTER — TREATMENT (OUTPATIENT)
Dept: PHYSICAL THERAPY | Facility: CLINIC | Age: 44
End: 2024-10-21
Payer: COMMERCIAL

## 2024-10-21 DIAGNOSIS — M76.822 LEFT TIBIALIS POSTERIOR TENDINITIS: ICD-10-CM

## 2024-10-21 PROCEDURE — 97140 MANUAL THERAPY 1/> REGIONS: CPT | Mod: GP

## 2024-10-21 PROCEDURE — 97110 THERAPEUTIC EXERCISES: CPT | Mod: GP

## 2024-10-21 ASSESSMENT — PAIN SCALES - GENERAL: PAINLEVEL_OUTOF10: 2

## 2024-10-21 NOTE — PROGRESS NOTES
Physical Therapy  Physical Therapy Treatment Note    Patient Name: Jaycob Caputo  MRN: [id number]  Today's Date: 10/21/2024  Time Calculation  Start Time: 1400  Stop Time: 1446  Time Calculation (min): 46 min    Insurance:  Visit number: 3 of 40  Authorization info: No Auth  Insurance Type: MMO SuperMed, Availity  Onset date: 7/1/2024     General:  Reason for visit: Posterior tibialis tendinitis   Referred by: Phan Ashley MD    Current Problem  1. Left tibialis posterior tendinitis  Follow Up In Physical Therapy            Precautions: ELEAZARADI Fall Risk Score (The score of 4 or more indicates an increased risk of falling): 4       Subjective:     Patient reports that he has been doing the exercises and also walking for about 20 minutes.  Some soreness after the walking.  Notes that he feels very inflexible while doing the stretches.     Pain  0-10 (Numeric) Pain Score: 2    Performing HEP?: Yes      Objective:   Posture: Pes planus R>L, Mild toeing out R> L     Gait: Stable without assistive device     Palpation: Tenderness along course of Left PTT                Treatment Performed:    Therapeutic Exercise:    33 min  Upright bike L3 6'  Incline calf stretch 1'x2  Bilateral heel raises on EOS with slow eccentric lowering 3x10  Inchworms blue t-band 20' x 2  Resisted inversion, eversion, and dorsiflexion green tband 2x20  Supine HS stretch with strap 1' L  Long-sitting gastroc stretch with strap 1'  Prone quad stretch with strap 1'    Manual Therapy:    10 min   STM and Graston along course of PTT L     Neuromuscular Re-education:  3 min  BOSU squat with hip abduction, blue Tband loop 3x10      Personalized Home Exercise Program:  Access Code: [email or web address]  URL: [email or web address]  Date: 10/09/2024  Prepared by: Desire Dickey     Exercises  - Long Sitting Calf Stretch with Strap  - 2 x daily - 7 x weekly - 3 reps - 30 hold  - Supine Hamstring Stretch with Strap  - 2 x daily -  7 x weekly - 3 reps - 30 seconds hold  - Long Sitting Eccentric Ankle Plantar Flexion with Resistance  - 1 x daily - 7 x weekly - 3 sets - 10 reps  - Long Sitting Ankle Inversion with Anchored Resistance  - 1 x daily - 7 x weekly - 3 sets - 10 reps  - Standing Heel Raise  - 1 x daily - 7 x weekly - 3 sets - 10 reps  - Single Leg Stance  - 1 x daily - 7 x weekly - 3 reps - 60 hold  - Standing Gastroc Stretch  - 2 x daily - 7 x weekly - 3 reps - 30 hold  - Standing Soleus Stretch  - 2 x daily - 7 x weekly - 3 reps - 30 seconds hold     Charges: Man x 1, TE x 2    Assessment:   Tolerated exercises well. Did have transient 4/10 pain following heel raises on the edge of the step, but pain did not persist. Minor soreness with manual treatment, but felt good at the completion of the session.       Plan:  Continue with gastrocsoleus flexibility, hip/core and lower leg strengthening, and manual therapy to reduce pain and improve function.  Guide in return to running program (starting with walk-jog)  when pain with ADLs and walking has reduced.        Desire Dickey, PT

## 2024-10-30 ENCOUNTER — TREATMENT (OUTPATIENT)
Dept: PHYSICAL THERAPY | Facility: CLINIC | Age: 44
End: 2024-10-30
Payer: COMMERCIAL

## 2024-10-30 DIAGNOSIS — M76.822 LEFT TIBIALIS POSTERIOR TENDINITIS: ICD-10-CM

## 2024-10-30 PROCEDURE — 97140 MANUAL THERAPY 1/> REGIONS: CPT | Mod: GP,CQ

## 2024-10-30 PROCEDURE — 97110 THERAPEUTIC EXERCISES: CPT | Mod: GP,CQ

## 2024-10-30 ASSESSMENT — PAIN SCALES - GENERAL: PAINLEVEL_OUTOF10: 3

## 2024-11-06 ENCOUNTER — APPOINTMENT (OUTPATIENT)
Dept: BEHAVIORAL HEALTH | Facility: CLINIC | Age: 44
End: 2024-11-06
Payer: COMMERCIAL

## 2024-11-06 ENCOUNTER — TREATMENT (OUTPATIENT)
Dept: PHYSICAL THERAPY | Facility: CLINIC | Age: 44
End: 2024-11-06
Payer: COMMERCIAL

## 2024-11-06 DIAGNOSIS — M76.822 LEFT TIBIALIS POSTERIOR TENDINITIS: ICD-10-CM

## 2024-11-06 DIAGNOSIS — F41.9 ANXIETY: ICD-10-CM

## 2024-11-06 PROCEDURE — 97110 THERAPEUTIC EXERCISES: CPT | Mod: GP,CQ

## 2024-11-06 PROCEDURE — 97140 MANUAL THERAPY 1/> REGIONS: CPT | Mod: GP,CQ

## 2024-11-06 PROCEDURE — 99214 OFFICE O/P EST MOD 30 MIN: CPT | Performed by: PSYCHIATRY & NEUROLOGY

## 2024-11-06 PROCEDURE — 1036F TOBACCO NON-USER: CPT | Performed by: PSYCHIATRY & NEUROLOGY

## 2024-11-06 ASSESSMENT — PAIN SCALES - GENERAL: PAINLEVEL_OUTOF10: 3

## 2024-11-06 NOTE — PROGRESS NOTES
Jaycob Caputo, a 43 y.o. male, presenting to Psychiatry for evaluation.  Patient is referred by Lopez Burton MD.         Virtual or Telephone Consent     An interactive audio and video telecommunication system which permits real time communications between the patient (at the originating site) and provider (at the distant site) was utilized to provide this telehealth service.   Verbal consent was requested and obtained from Jaycob Caputo on this date, 11/06/24 for a telehealth visit.       Patient doing okay   Has tendon injury in left leg and has been doing PT  Still is having a lot of pain   Has not been able to get to the gym - can do the bike but has pain when he walks  Doing door dash for some extra money  GF lives an hour away and does not want to sacrifice time with her  She asked for some space which has been difficult   Income has been cut in half and expenses have gone up  Working with therapist and family about what to do   Went to [employer] and [place]  Still processing loss of brother  Parents getting older  Klonipin twice a day - one in am   Has some anxiety and depression mostly from situation at work but feels he is managing well  Feels it is situational depression   Does not feel he needs the antidepressant at this time  Is taking Klonipin twice a day  Using edibles which helps with nightmares   Went to dispensary and asked for something to relax him and help with evening anxiety    Patient denies SI, HI, manic or psychotic symptoms.     Psychiatric Review Of Systems:  Depressive Symptoms: negative  Manic Symptoms: negative  Anxiety Symptoms: reduced    Psychotic Symptoms: negative  Other Symptoms:     Current Medications:  Klonipin 1 mg PO BID PRN    Medical History:  Medical History   No past medical history on file.         Past Psychiatric History:   Diagnoses:   possible PTSD, anxiety, depression  Previous Psychiatrist:   Therapy:   Mika Laurent, specializes in  nightmares/night time behavior - is on leave currently, he has appointment May 1, 2024  Current psychiatric medications: none   Past psychiatric medications: Cymbalta ok; SSRIs cannot take - gets more anxious; and very depressed    Past psychiatric treatments:  unknown  Hospitalizations:none  Suicide attempts: none  Family psychiatric history: unknown     Social History:   Currently lives:  [place], lives with self, has girlfriend who has kids,  8 months who is ; she does house cleaning; he tried online dating; kids ages 14, 11 and 8    Education:   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; [employer] , [employer]    History of Learning Problem: none reported  Work/Finances:  car business for 6 years; ran summer camp; [employer]; 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;    Marital history/children: none  Current stressors:  step brother passed away from has cancer  Social support: girlfriend and family   Legal History: none   History: none  Family:   age 11; parents remarried   Siblings: blood brother age 39, 4 step sisters and 2 step brother;  end stage cancer step brother age 46 have 8 year old  History of violence: none reported     Substance Use History:  Tobacco use: none  Use of alcohol:  unknown  Use of caffeine:  unknown  Use of other substances: unknown  Legal consequences of substance use: n/a  Substance use disorder treatment: n/a     Record Review: brief     Medical Review Of Systems:  Pertinent items are noted in HPI.     OARRS:  No data recorded  I have personally reviewed the OARRS report for Jaycob Caputo. I have considered the risks of abuse, dependence, addiction and diversion and I believe that it is clinically appropriate for Jaycob Caputo to be prescribed this medication     Is the patient prescribed a combination of a benzodiazepine and opioid?  No     Last Urine Drug Screen:                 Lab on  06/10/2024   Component Date Value Ref Range Status    Amphetamine Screen, Urine 06/10/2024 Presumptive Negative  Presumptive Negative Final     CUTOFF LEVEL: 500 NG/ML   Cross-reactivity has been reported with high concentrations   of the following drugs: buproprion, chloroquine, chlorpromazine,   ephedrine, mephentermine, fenfluramine, phentermine,   phenylpropanolamine, pseudoephedrine, and propranolol.    Barbiturate Screen, Urine 06/10/2024 Presumptive Negative  Presumptive Negative Final     CUTOFF LEVEL: 200 NG/ML    Benzodiazepines Screen, Urine 06/10/2024 Presumptive Negative  Presumptive Negative Final     CUTOFF LEVEL: 200 NG/ML    Cannabinoid Screen, Urine 06/10/2024 Presumptive Positive (A)  Presumptive Negative Final     CUTOFF LEVEL: 50 NG/ML    Cocaine Metabolite Screen, Urine 06/10/2024 Presumptive Negative  Presumptive Negative Final     CUTOFF LEVEL: 150 NG/ML    Fentanyl Screen, Urine 06/10/2024 Presumptive Negative  Presumptive Negative Final     CUTOFF LEVEL: 5 NG/ML    Opiate Screen, Urine 06/10/2024 Presumptive Negative  Presumptive Negative Final     CUTOFF LEVEL: 300 NG/ML  The opiate screen does not detect fentanyl, meperidine, or   tramadol. Oxycodone is not consistently detected (refer to  Oxycodone Screen, Urine result).    Oxycodone Screen, Urine 06/10/2024 Presumptive Negative  Presumptive Negative Final     CUTOFF LEVEL: 100 NG/ML  This test will accurately detect both oxycodone and oxymorphone.    PCP Screen, Urine 06/10/2024 Presumptive Negative  Presumptive Negative Final     CUTOFF LEVEL:  25 NG/ML  Cross-reactivity has been reported with dextromethorphan.    Methadone Screen, Urine 06/10/2024 Presumptive Negative  Presumptive Negative Final     CUTOFF LEVEL: 150 NG/ML  The metabolite L-alpha-acetylmethadol (LAAM) is not  detected by this method in concentrations that would  be found in the urine of patients on LAAM therapy.    94-Bqb-5-carboxy-THC, Urn, Quant 06/10/2024 60  ng/mL  Final     Comment: INTERPRETIVE INFORMATION: THC Metabolite, Urine,                             Quantitative     Methodology: Quantitative Liquid Chromatography-Tandem Mass   Spectrometry  Positive cutoff: 15 ng/mL  For medical purposes only; not valid for forensic use.  The drug analyte detected in this assay, 9-carboxy THC, is a   metabolite of delta-9-tetrahydrocannabinol (THC). Detection of   9-carboxy THC suggests use of, or exposure to, a product   containing THC.  This test cannot distinguish between prescribed   or non-prescribed forms of THC, nor can it distinguish between   active or passive use. The 9-carboxy THC metabolite can be   detected in urine for several weeks. Normalization of results to   creatinine concentration can help document elimination or suggest   recent use, when specimens are collected at least one week apart.     This test was developed and its performance characteristics   determined by [facility]. It has not been cleared or   approved by the US Food and Drug                             Administration. This test was   performed in a CLIA certified laboratory and is intended for   clinical purposes.  Performed By: [facility]  [address] [place] [id number]  : Jaycob Rodney MD, PhD  CLIA Number: [id number]         Controlled Substance Agreement:  signed today      Activities of Daily Living:   Is your overall impression that this patient is benefiting (symptom reduction outweighs side effects) from benzodiazepine therapy? Yes        Objective   Mental Status Exam  Appearance: casually dressed, good g/h  Attitude: Calm, cooperative, and engaged in conversation.  Behavior: Appropriate eye contact.   Motor Activity: No psychomotor agitation or retardation. No abnormal movements, tremors or tics. No evidence of extrapyramidal symptoms or tardive dyskinesia.  Speech: Regular rate, rhythm, volume. Spontaneous, no pressured speech.  Mood:  "\"okay\"  Affect: Euthymic, full range, mood congruent.  Thought Process: Linear, logical, and goal-directed. No loose associations or gross thought disorganization.  Thought Content: Denied current suicidal ideation or thoughts of harm to self, denied homicidal ideation or thoughts of harm to others. No delusional thinking elicited. No perseverations or obsessions identified.   Perception: Did not endorse auditory or visual hallucinations, did not appear to be responding to hallucinatory stimuli.   Cognition: Alert, oriented x3. Preserved attention span and concentration, recent and remote memory. Adequate fund of knowledge. No deficits in language.   Insight: Fair, in regards to understanding mental health condition  Judgement: Fair              Vitals:     05/08/24 1020   BP: (!) 130/91   Pulse: 75   Resp: 16   Temp: 36.7 °C (98.1 °F)         Risk Assessment:     Risk of harm to self: low     Risk of harm to others: low      DXS:   Anxiety unspecified  MDD, recurrent     Assessment:  Patient is a 43 year old  male  with a history of depression and anxiety who is  under great stress financially and medically.  Patient   is likely going to have to find a new job. 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    Still does not want to go back on [employer].     Patient denies SI, HI, manic or psychotic symptoms.  No side effects reported.        Plan/Recommendations:  Medications: continue Klonipin 1 mg PO daily and PRN at bedtime anxiety/sleep  Orders: continue therapy  Follow up:  8 weeks  Call  Psychiatry at [phone] with issues.  For [facility] residents, [employer] is a 24/7 hotline you can call for assistance [[phone]]. Please call [phone] or go to your closest Emergency Room if you feel unsafe. This includes thoughts of hurting yourself or anyone else, or having other troubles such as hearing voices, seeing visions, or having new and scary thoughts " about the people around you.     Review with patient: Treatment plan reviewed with the patient.  Medication risks/benefit reviewed with the patient

## 2024-11-06 NOTE — PROGRESS NOTES
"  Physical Therapy  Physical Therapy Treatment Note    Patient Name: Jaycob Caputo  MRN: [id number]  Today's Date: 11/6/2024  Time Calculation  Start Time: 1355  Stop Time: 1435  Time Calculation (min): 40 min  Insurance:  Visit number: 5 of 40  Authorization info: No Auth  Insurance Type: MMO [employer], Availity  Onset date: 7/1/2024     General:  Reason for visit: Posterior tibialis tendinitis   Referred by: Phan Ashley MD    Current Problem  1. Left tibialis posterior tendinitis  Follow Up In Physical Therapy          Precautions: STEADI Fall Risk Score (The score of 4 or more indicates an increased risk of falling): 4     Subjective:   Patient reports having done HEP 5 days since last treatment session independently.     Pain  0-10 (Numeric) Pain Score: 3    Performing HEP?: Yes    Objective:   Palpable tightness in anterior lower leg medial to tibial tuberosity    Treatment Performed:    Therapeutic Exercise:    25 min  Upright bike 5' lvl 3   Heel to heel seated EOB on plinth x50   Gastroc stretch 1' incline board   Tandem stance rows 5x8 7.5#  Toe abduction 3x5 5\" holds    Manual Therapy:    15 min   STM and Graston along course of PTT L      Personalized Home Exercise Program:  Access Code: [email or web address]  URL: [email or web address]  Date: 10/09/2024  Prepared by: Desire Dickey     Exercises  - Long Sitting Calf Stretch with Strap  - 2 x daily - 7 x weekly - 3 reps - 30 hold  - Supine Hamstring Stretch with Strap  - 2 x daily - 7 x weekly - 3 reps - 30 seconds hold  - Long Sitting Eccentric Ankle Plantar Flexion with Resistance  - 1 x daily - 7 x weekly - 3 sets - 10 reps  - Long Sitting Ankle Inversion with Anchored Resistance  - 1 x daily - 7 x weekly - 3 sets - 10 reps  - Standing Heel Raise  - 1 x daily - 7 x weekly - 3 sets - 10 reps  - Single Leg Stance  - 1 x daily - 7 x weekly - 3 reps - 60 hold  - Standing Gastroc Stretch  - 2 x daily - 7 x weekly - 3 reps - 30 " hold  - Standing Soleus Stretch  - 2 x daily - 7 x weekly - 3 reps - 30 seconds hold     Charges: Man x 1, TE x 2    Assessment:   Pt was informed and instructed to try to find some non-custom orthotics to see if they provide any relief. Pt states that MD mentioned something about this in the past but had never sought it out independently. Pt was instructed to attempt to maintain and arch while perform RITO rows.     Plan:  Continue to progress arch based exercises in upcoming appointments.       Raphael Pro, PTA

## 2024-11-13 ENCOUNTER — TREATMENT (OUTPATIENT)
Dept: PHYSICAL THERAPY | Facility: CLINIC | Age: 44
End: 2024-11-13
Payer: COMMERCIAL

## 2024-11-13 DIAGNOSIS — M76.822 LEFT TIBIALIS POSTERIOR TENDINITIS: ICD-10-CM

## 2024-11-13 PROCEDURE — 97110 THERAPEUTIC EXERCISES: CPT | Mod: GP,CQ

## 2024-11-13 ASSESSMENT — PAIN SCALES - GENERAL: PAINLEVEL_OUTOF10: 2

## 2024-11-13 NOTE — PROGRESS NOTES
"  Physical Therapy  Physical Therapy Treatment Note    Patient Name: Jaycob Caputo  MRN: [id number]  Today's Date: 11/13/2024  Time Calculation  Start Time: 0916  Stop Time: 1000  Time Calculation (min): 44 min  Insurance:  Visit number: 6 of 40  Authorization info: No Auth  Insurance Type: MMO [employer], Availity  Onset date: 7/1/2024     General:  Reason for visit: Posterior tibialis tendinitis   Referred by: Phan Ashley MD    Current Problem  1. Left tibialis posterior tendinitis  Follow Up In Physical Therapy          Precautions: ELEAZARADI Fall Risk Score (The score of 4 or more indicates an increased risk of falling): 4     Subjective:   Patient reports feeling about the same since last session having increases in pain when standing up and performing certain HEP exercises. Pt reports having a constant pain of around 2 throughout the entire day.   Pain  0-10 (Numeric) Pain Score: 2    Performing HEP?: Yes    Objective:   During end range of squat 5th and 4th digit of foot lifted up during squat prior to cueing.     Treatment Performed:    Therapeutic Exercise:    40 min  UBE 5' lvl 5  Gastroc stretch 1' !p   Sit to stands with blue band 4x10  Dorsiflexion stretch 1'  Modified side planks 2x10 5\" hold   Ankle inversion 3x10 green band   Ankle eversion 3x10 green band     Personalized Home Exercise Program:  Access Code: [email or web address]  URL: [email or web address]  Date: 10/09/2024  Prepared by: Desire Dickey     Exercises  - Long Sitting Calf Stretch with Strap  - 2 x daily - 7 x weekly - 3 reps - 30 hold  - Supine Hamstring Stretch with Strap  - 2 x daily - 7 x weekly - 3 reps - 30 seconds hold  - Long Sitting Eccentric Ankle Plantar Flexion with Resistance  - 1 x daily - 7 x weekly - 3 sets - 10 reps  - Long Sitting Ankle Inversion with Anchored Resistance  - 1 x daily - 7 x weekly - 3 sets - 10 reps  - Standing Heel Raise  - 1 x daily - 7 x weekly - 3 sets - 10 reps  - Single Leg " Stance  - 1 x daily - 7 x weekly - 3 reps - 60 hold  - Standing Gastroc Stretch  - 2 x daily - 7 x weekly - 3 reps - 30 hold  - Standing Soleus Stretch  - 2 x daily - 7 x weekly - 3 reps - 30 seconds hold     Charges: TE x 3    Assessment:   Pt was informed and instructed to try to find some non-custom orthotics to see if they provide any relief. Pt states that MD mentioned something about this in the past but had never sought it out independently. Pt was instructed to attempt to maintain and arch while perform RITO rows.     Plan:  Continue to progress arch based exercises in upcoming appointments.       Raphael Pro, PTA

## 2024-11-18 ENCOUNTER — TREATMENT (OUTPATIENT)
Dept: PHYSICAL THERAPY | Facility: CLINIC | Age: 44
End: 2024-11-18
Payer: COMMERCIAL

## 2024-11-18 DIAGNOSIS — M76.822 LEFT TIBIALIS POSTERIOR TENDINITIS: ICD-10-CM

## 2024-11-18 PROCEDURE — 97110 THERAPEUTIC EXERCISES: CPT | Mod: GP

## 2024-11-18 PROCEDURE — 97535 SELF CARE MNGMENT TRAINING: CPT | Mod: GP

## 2024-11-18 NOTE — PROGRESS NOTES
Physical Therapy  Physical Therapy Orthopedic Progress Note    Patient Name: Jaycob Caputo  MRN: [id number]  Today's Date: 11/18/2024  Time Calculation  Start Time: 0745  Stop Time: 0824  Time Calculation (min): 39 min    Insurance:  Visit number: 7 of 40  Authorization info: No Auth  Insurance Type: MMO [employer], Availity  Onset date: 7/1/2024     General:  Reason for visit: Posterior tibialis tendinitis   Referred by: Phan Ashley MD    Current Problem  1. Left tibialis posterior tendinitis  Follow Up In Physical Therapy          Precautions: LILIANA Fall Risk Score (The score of 4 or more indicates an increased risk of falling): 4       Subjective:     Patient reports that the pain fluctuates.  Sometimes there is no pain, but other times, pain is triggered with simple activities and it can be difficult to walk. Generally doing ok with stairs, but if he runs up the stairs quickly, it can trigger the pain.  Not able to run for fitness which is frustrating.     Current Condition:   Fluctuating.  Mildly worse    Pain  Current: 3/10  At worst: 7/10    Location: Posterior tibialis L  Description: Intermittent, sharp    Exacerbating Factors: sometimes walking, sometimes stairs, sometimes yard work    Functional Limitations: Running    Self Reported Function (0-100%) = 70%  - 100% being back to PLOF    Performing HEP?: Partially, scaled back this week as it seemed to increase the pain      Objective:   Posture: Pes planus R>L, Mild toeing out R> L     Gait: Stable without assistive device     Palpation: Tenderness along course of Left PTT                                                  ROM                            Knee AROM (Degrees)                                         (R)                    (L)  Flexion:                        141                   139                                             Extension:                    0                      0                                                       "                       Ankle AROM (Degrees)                                         (R)                    (L)  Plantarflexion:              51                     49                                               Dorsiflexion:                 19                     13                                    Inversion:                     47                     49                                    Eversion:                      20                     19                                                                                            Strength Testing                                                    Knee                                      (R)                    (L)  Flexion:                        5                      5                                                Extension:                    5                      5                                          Ankle                                      (R)                    (L)  Plantarflexion:              5                      4, pain                                               Dorsiflexion:                 5                      5                                    Inversion:                     5                      5                                                Eversion:                      5                      5               Unilateral Heel Raises: 25 reps 16 reps, sharp pain afterwards                   Flexibility                          (R)                    (L)  Hamstrings:                 -34                   -39  Hip Flexors:                  Mild hendrickson Mild hendrickson  Quadriceps:                 4.5\"                     5\"  Gastrocsoleus:             10                      10                                         Functional Movement  Sit to stand: WNL  Squat: Mild reduction in depth                                        Balance     Single Leg Balance:       (R)>20\"                       (L)>20\"                                          "    Ankle Joint Mobility: Mild hypomobility bilaterally                                            Special Tests  Ligament Tests:              Anterior Drawer Test: (-) L              Talar Tilt:  (-) L  Navicular Drop: (+) R and L        Outcome Measures: Updated 11/18/2024  Other Measures  Lower Extremity Funtional Score (LEFS): 42/80      Goals: Updated 11/18/2024  Active       PT Problem       PT Goal 1 (Progressing)       Start:  10/09/24    Expected End:  11/06/24       Ankle DF ROM WNL. Nearly Met  L ankle plantar flexion strength measures improved to 5/5. Not Met  Maintain SLS with EC for at least 10 seconds with good control.  Met  Walk 30 minutes without pain and without limping. Not Met  Reciprocal ascent/descent of stairs without pain. Nearly Met  Squat with good dynamic valgus control and without pain. Met           PT Goal 2 (Not Progressing)       Start:  10/09/24    Expected End:  12/04/24       LE flexibility limitations reduced to minimal limitation for improved functional mobility. Not met  Run 1 mile without pain or swelling.  Not met  LEFS improved by at least 12 points.  Not met  Patient will rate pain < 2/10 at worst to improve activity tolerance. Not Met  Independent with home exercise program for symptom reduction and functional gains by week 6.  Met             Treatment Performed:      Therapeutic Exercise:    24 min  RKarma Landaper 4'  Performed recheck    Self Care:     15 min  Discussed nature of overuse injuries, activity modifications, POC, recommendations for orthotics, self massage to PTT.      Personalized Home Exercise Program:  Access Code: [email or web address]  URL: [email or web address]  Date: 10/09/2024  Prepared by: Desire Dickey     Exercises  - Long Sitting Calf Stretch with Strap  - 2 x daily - 7 x weekly - 3 reps - 30 hold  - Supine Hamstring Stretch with Strap  - 2 x daily - 7 x weekly - 3 reps - 30 seconds hold  - Long Sitting Eccentric Ankle Plantar Flexion  with Resistance  - 1 x daily - 7 x weekly - 3 sets - 10 reps  - Long Sitting Ankle Inversion with Anchored Resistance  - 1 x daily - 7 x weekly - 3 sets - 10 reps  - Standing Heel Raise  - 1 x daily - 7 x weekly - 3 sets - 10 reps  - Single Leg Stance  - 1 x daily - 7 x weekly - 3 reps - 60 hold  - Standing Gastroc Stretch  - 2 x daily - 7 x weekly - 3 reps - 30 hold  - Standing Soleus Stretch  - 2 x daily - 7 x weekly - 3 reps - 30 seconds hold    EDUCATION: home exercise program, plan of care, activity modifications, pain management, and injury pathology       Charges: TE x 2, Self-care x 1    Assessment:  Jaycob continues to struggle with pain at his R posterior Tibial tendon despite physical therapy interventions.  Pain levels fluctuate, but limit his ability to consistently walk and perform ADLs without pain. He has not been able to proceed with running activities.         Plan of Care: Updated 11/18/2024     Follow-up with Dr. Ashley on 11/20/24. PT on hold, pending MD recommendations.      Desire Dickey, PT

## 2024-11-20 ENCOUNTER — APPOINTMENT (OUTPATIENT)
Dept: ORTHOPEDIC SURGERY | Facility: CLINIC | Age: 44
End: 2024-11-20
Payer: COMMERCIAL

## 2024-11-20 DIAGNOSIS — M76.822 LEFT TIBIALIS POSTERIOR TENDINITIS: ICD-10-CM

## 2024-11-20 PROCEDURE — 99213 OFFICE O/P EST LOW 20 MIN: CPT | Performed by: STUDENT IN AN ORGANIZED HEALTH CARE EDUCATION/TRAINING PROGRAM

## 2024-11-20 ASSESSMENT — PAIN - FUNCTIONAL ASSESSMENT: PAIN_FUNCTIONAL_ASSESSMENT: 0-10

## 2024-11-20 ASSESSMENT — PAIN SCALES - GENERAL: PAINLEVEL_OUTOF10: 6

## 2024-11-20 ASSESSMENT — PAIN DESCRIPTION - DESCRIPTORS: DESCRIPTORS: ACHING;SHARP;SHOOTING

## 2024-11-20 NOTE — PROGRESS NOTES
ORTHOPAEDIC SURGERY OUTPATIENT PROGRESS NOTE    Chief Complaint:  Left ankle pain    History Of Present Illness  Jaycob Caputo is a 44 y.o. male s/p L Tibia IMN EILEEN (screws only) 2016 with Dr. Erickson who presents for evaluation of left ankle pain as a referral from Dr. Mayberry.  Patient has a remote history of a left tib-fib fracture from 2000.  Patient reports difficulty returning to running when the weather improves.  Patient reports that this is typical for him but his symptoms usually resolve.  Pain is described as 6 out of 10 and associated with stabbing and radiation.  This has been present from May and is unchanged.  Patient reports no interval history of trauma.  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  Symptoms are typically worse the day following a longer workday.  Patient otherwise remains in his usual state of health.  He was apparently informed that he would develop arthritis subsequent to his surgery from 2000.  He has not had any formal HEP, PT or CSI.  He has not been using any braces, orthotics or inserts in his shoes.    10/09/2024: Patient returns for follow-up of his left foot pain as above.  Current pain is reported as 1 out of 10.  He began physical therapy as of today.  He had a 2-week period since our last follow-up without any pain at all.  He does notice intermittent continued knee pain as well as pain that he attributes to the nail.    11/20/2024: Patient returns for follow-up of his left ankle pain as above.  He is currently reporting 6 out of 10 pain.  Pain is made worse with walking, standing and exercising.  Patient relates that several weeks ago he was in a wedding in [place], he then went on a 5-hour hike without pain or problems.  Subsequent to that he developed the acute onset of pain while walking.  He has been in physical therapy without significant relief.     Past Medical History  No past medical history on file.    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   Tobacco Use    Smoking status: Never     Passive exposure: Never    Smokeless tobacco: Never   Vaping Use    Vaping status: Never Used   Substance and Sexual Activity    Alcohol use: Yes     Alcohol/week: 4.0 - 5.0 standard drinks of alcohol     Types: 4 - 5 Standard drinks or equivalent per week    Drug use: Yes     Types: Marijuana     Comment: occasional    Sexual activity: Yes     Partners: Female       Family History  Family History   Problem Relation Name Age of Onset    Skin cancer Mother      Hypertension Father      Skin cancer Father      Hypothyroidism Brother      Heart attack Paternal Grandfather          Allergies  Allergies   Allergen Reactions    Lamotrigine Rash       Review of Systems  REVIEW OF SYSTEMS  Constitutional: no unplanned weight loss  Psychiatric: no suicidal ideation  ENT: no vision changes, no sinus problems  Pulmonary: no shortness of breath  Lymphatic: no enlarged lymph nodes  Cardiovascular: no chest pain or shortness of breath  Gastrointestinal: no stomach problems  Genitourinary: no dysuria   Skin: no rashes  Endocrine: no thyroid problems  Neurological: no headache, no numbness  Hematological: no easy bruising  Musculoskeletal: Left ankle pain     Physical Exam  PHYSICAL EXAMINATION  Constitutional Exam: well developed and well nourished  Psychiatric Exam: alert and oriented, appropriate mood and behavior  Eye Exam: EOMI  Pulmonary Exam: breathing non-labored, no apparent distress  Lymphatic exam: no appreciable lymphadenopathy in the lower extremities  Cardiovascular exam: RRR to peripheral palpation, DP pulses 2+, PT 2+,  toes are pink with good capillary refill, no pitting edema  Skin exam: no open lesions, rashes, abrasions or ulcerations  Neurological exam: sensation to light touch intact in both lower extremities in peripheral and dermatomal distributions (except for any abnormalities noted in musculoskeletal exam)    Musculoskeletal exam: Left lower extremity examination.  Patient pain continues to localize to the posterior medial aspect of the ankle.  He continues with tenderness to palpation along the PTT today.  There is a negative Tinel's at the posterior tarsal tunnel.  Patient has physiologic hindfoot valgus with relatively neutral arch posture and no significant forefoot deformity noted. Patient has sensation intact to light touch grossly in a saphenous, sural, superficial peroneal, deep peroneal and tibial nerve distribution.  He has 5 out of 5 strength in plantarflexion, dorsiflexion and EHL.  Patient has 2+ DP/PT pulse palpated. No significant pain with dorsiflexion external rotation, negative anterior medial drawer test.      Last Recorded Vitals  There were no vitals taken for this visit.    Laboratory Results  No results found for this or any previous visit (from the past 24 hours).     Radiology Results  No new imaging at this visit.    Assessment/Plan:   44-year-old male who in my impression has left ankle pain likely secondary to posterior tibialis tendinitis.  I have reviewed the diagnosis and treatment options extensively with the patient.  I would recommend the patient continue weightbearing to his tolerance in his left lower extremity.  Due to the persistence of his pain having failed an appropriate trial of activity modification, anti-inflammatories as well as physical therapy without sustained or significant relief, I will obtain an MRI of his left ankle to more completely evaluate his PTT, rule out the possibility of talar OLT and evaluate his ligaments.  I would plan to see the patient back in 3 weeks  following MRI completion for review and discussion regarding next treatment steps.  Upon return patient would not require further imaging.    Phan Ashley MD, SHERIDAN  Department of Orthopaedic Surgery  [facility]    The diagnosis and treatment plan were reviewed with the patient. All questions were answered. The patient verbalized understanding of the treatment plan. There were no barriers to understanding identified.    Note dictated with [email or web address] software.  Completed without full type editing and sent to avoid delay.

## 2024-12-04 ENCOUNTER — HOSPITAL ENCOUNTER (OUTPATIENT)
Dept: RADIOLOGY | Facility: HOSPITAL | Age: 44
Discharge: HOME | End: 2024-12-04
Payer: COMMERCIAL

## 2024-12-04 DIAGNOSIS — M76.822 LEFT TIBIALIS POSTERIOR TENDINITIS: ICD-10-CM

## 2024-12-04 PROCEDURE — 73721 MRI JNT OF LWR EXTRE W/O DYE: CPT | Mod: LEFT SIDE | Performed by: RADIOLOGY

## 2024-12-04 PROCEDURE — 73721 MRI JNT OF LWR EXTRE W/O DYE: CPT | Mod: LT

## 2024-12-11 ENCOUNTER — OFFICE VISIT (OUTPATIENT)
Dept: ORTHOPEDIC SURGERY | Facility: CLINIC | Age: 44
End: 2024-12-11
Payer: COMMERCIAL

## 2024-12-11 DIAGNOSIS — M76.822 LEFT TIBIALIS POSTERIOR TENDINITIS: ICD-10-CM

## 2024-12-11 DIAGNOSIS — Z87.81 HISTORY OF TIBIAL FRACTURE: Primary | ICD-10-CM

## 2024-12-11 PROCEDURE — 99213 OFFICE O/P EST LOW 20 MIN: CPT | Performed by: STUDENT IN AN ORGANIZED HEALTH CARE EDUCATION/TRAINING PROGRAM

## 2024-12-11 ASSESSMENT — PAIN DESCRIPTION - DESCRIPTORS: DESCRIPTORS: ACHING;DISCOMFORT

## 2024-12-11 ASSESSMENT — PAIN SCALES - GENERAL: PAINLEVEL_OUTOF10: 2

## 2024-12-11 ASSESSMENT — PAIN - FUNCTIONAL ASSESSMENT: PAIN_FUNCTIONAL_ASSESSMENT: 0-10

## 2024-12-11 NOTE — PROGRESS NOTES
ORTHOPAEDIC SURGERY OUTPATIENT PROGRESS NOTE    Chief Complaint:  Left ankle pain    History Of Present Illness  Jaycob Caputo is a 44 y.o. male s/p L Tibia IMN EILEEN (screws only) 2016 with Dr. Erickson who presents for evaluation of left ankle pain as a referral from Dr. Mayberry.  Patient has a remote history of a left tib-fib fracture from 2000.  Patient reports difficulty returning to running when the weather improves.  Patient reports that this is typical for him but his symptoms usually resolve.  Pain is described as 6 out of 10 and associated with stabbing and radiation.  This has been present from May and is unchanged.  Patient reports no interval history of trauma.  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  Symptoms are typically worse the day following a longer workday.  Patient otherwise remains in his usual state of health.  He was apparently informed that he would develop arthritis subsequent to his surgery from 2000.  He has not had any formal HEP, PT or CSI.  He has not been using any braces, orthotics or inserts in his shoes.    10/09/2024: Patient returns for follow-up of his left foot pain as above.  Current pain is reported as 1 out of 10.  He began physical therapy as of today.  He had a 2-week period since our last follow-up without any pain at all.  He does notice intermittent continued knee pain as well as pain that he attributes to the nail.    11/20/2024: Patient returns for follow-up of his left ankle pain as above.  He is currently reporting 6 out of 10 pain.  Pain is made worse with walking, standing and exercising.  Patient relates that several weeks ago he was in a wedding in [place], he then went on a 5-hour hike without pain or problems.  Subsequent to that he developed the acute onset of pain while walking.  He has been in physical therapy without significant relief.    12/11/2024: Patient returns for follow-up of his left ankle pain as  above.  Typical pain is now reported as 2 out of 10, this has improved markedly from stopping physical therapy.  Patient relays further details regarding his index injury, he reports that he was playing basketball in college at [employer] and sustained an impending open tib-fib fracture with comminution.  He describes that approximately 1 year following surgery he was treated surgically with dynamization of the nail.  His typical symptoms onset with impact activity and have remitted of late.     Past Medical History  No past medical history on file.    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   Tobacco Use    Smoking status: Never     Passive exposure: Never    Smokeless tobacco: Never   Vaping Use    Vaping status: Never Used   Substance and Sexual Activity    Alcohol use: Yes     Alcohol/week: 4.0 - 5.0 standard drinks of alcohol     Types: 4 - 5 Standard drinks or equivalent per week    Drug use: Yes     Types: Marijuana     Comment: occasional    Sexual activity: Yes     Partners: Female       Family History  Family History   Problem Relation Name Age of Onset    Skin cancer Mother      Hypertension Father      Skin cancer Father      Hypothyroidism Brother      Heart attack Paternal Grandfather          Allergies  Allergies   Allergen Reactions    Lamotrigine Rash       Review of Systems  REVIEW OF SYSTEMS  Constitutional: no unplanned weight loss  Psychiatric: no suicidal ideation  ENT: no vision changes, no sinus problems  Pulmonary: no shortness of breath  Lymphatic: no enlarged lymph nodes  Cardiovascular: no chest pain or shortness of breath  Gastrointestinal: no stomach  problems  Genitourinary: no dysuria   Skin: no rashes  Endocrine: no thyroid problems  Neurological: no headache, no numbness  Hematological: no easy bruising  Musculoskeletal: Left ankle pain     Physical Exam  PHYSICAL EXAMINATION  Constitutional Exam: well developed and well nourished  Psychiatric Exam: alert and oriented, appropriate mood and behavior  Eye Exam: EOMI  Pulmonary Exam: breathing non-labored, no apparent distress  Lymphatic exam: no appreciable lymphadenopathy in the lower extremities  Cardiovascular exam: RRR to peripheral palpation, DP pulses 2+, PT 2+, toes are pink with good capillary refill, no pitting edema  Skin exam: no open lesions, rashes, abrasions or ulcerations  Neurological exam: sensation to light touch intact in both lower extremities in peripheral and dermatomal distributions (except for any abnormalities noted in musculoskeletal exam)    Musculoskeletal exam: Left lower extremity examination.  Patient pain continues to localize the posterior medial aspect of the ankle and more proximal on examination today.  Minimal tenderness to palpation of the PTT, negative Tinel's at the posterior tarsal tunnel. Patient has physiologic hindfoot valgus with relatively neutral arch posture and no significant forefoot deformity noted. Patient has sensation intact to light touch grossly in a saphenous, sural, superficial peroneal, deep peroneal and tibial nerve distribution.  He has 5 out of 5 strength in plantarflexion, dorsiflexion and EHL.  Patient has 2+ DP/PT pulse palpated. No significant pain with dorsiflexion external rotation, negative anterior medial drawer test.      Last Recorded Vitals  There were no vitals taken for this visit.    Laboratory Results  No results found for this or any previous visit (from the past 24 hours).     Radiology Results  MRI left ankle without contrast reviewed and independently evaluated by me on 12/11/2024 demonstrates no obvious PTT tendinopathy or spring  ligament tear.  Joint space well-maintained, no obvious ligamentous injury.    Assessment/Plan:   44-year-old male who in my impression has resolving left ankle pain in the setting of prior tibia IMN for tib/fib fracture likely attributable to PTT without significant tendinopathy identified by MRI.  I have reviewed the diagnosis and treatment options extensively with the patient.  I would recommend the patient continue weightbearing to his tolerance in his left lower extremity.  I have no formal restrictions for him.  I counseled the patient that he should steadily increase his activity and overall limit impact training as this typically onsets his symptoms.  I would plan to see the patient back on an as-needed basis.  I encouraged the patient contact the office if he develops any new pain, worsening symptoms she has any further questions.  Upon return, patient would not require further imaging.    Phan Ashley MD, SHERIDAN  Department of Orthopaedic Surgery  [facility]    The diagnosis and treatment plan were reviewed with the patient. All questions were answered. The patient verbalized understanding of the treatment plan. There were no barriers to understanding identified.    Note dictated with [employer] software.  Completed without full type editing and sent to avoid delay.

## 2024-12-30 DIAGNOSIS — F43.9 TRAUMA AND STRESSOR-RELATED DISORDER: ICD-10-CM

## 2024-12-30 DIAGNOSIS — F41.9 ANXIETY: ICD-10-CM

## 2024-12-30 RX ORDER — CLONAZEPAM 1 MG/1
1 TABLET ORAL 2 TIMES DAILY
Qty: 60 TABLET | Refills: 0 | Status: SHIPPED | OUTPATIENT
Start: 2024-12-30 | End: 2025-01-29

## 2025-01-08 ENCOUNTER — APPOINTMENT (OUTPATIENT)
Dept: BEHAVIORAL HEALTH | Facility: CLINIC | Age: 45
End: 2025-01-08
Payer: COMMERCIAL

## 2025-01-22 ENCOUNTER — APPOINTMENT (OUTPATIENT)
Dept: BEHAVIORAL HEALTH | Facility: CLINIC | Age: 45
End: 2025-01-22
Payer: COMMERCIAL

## 2025-01-22 DIAGNOSIS — F41.9 ANXIETY: ICD-10-CM

## 2025-01-22 DIAGNOSIS — F43.9 TRAUMA AND STRESSOR-RELATED DISORDER: ICD-10-CM

## 2025-01-22 PROCEDURE — 99214 OFFICE O/P EST MOD 30 MIN: CPT | Performed by: PSYCHIATRY & NEUROLOGY

## 2025-01-22 RX ORDER — CLONAZEPAM 1 MG/1
1 TABLET ORAL 2 TIMES DAILY
Qty: 60 TABLET | Refills: 2 | Status: SHIPPED | OUTPATIENT
Start: 2025-01-29 | End: 2025-04-29
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
## 2025-02-05 ENCOUNTER — APPOINTMENT (OUTPATIENT)
Dept: PRIMARY CARE | Facility: CLINIC | Age: 45
End: 2025-02-05
Payer: COMMERCIAL

## 2025-02-05 VITALS
OXYGEN SATURATION: 96 % | BODY MASS INDEX: 27.85 KG/M2 | DIASTOLIC BLOOD PRESSURE: 80 MMHG | SYSTOLIC BLOOD PRESSURE: 120 MMHG | HEIGHT: 64 IN | WEIGHT: 163.14 LBS | HEART RATE: 77 BPM

## 2025-02-05 DIAGNOSIS — E78.5 DYSLIPIDEMIA: ICD-10-CM

## 2025-02-05 DIAGNOSIS — D22.9 BENIGN SKIN MOLE: ICD-10-CM

## 2025-02-05 DIAGNOSIS — M17.12 ARTHRITIS OF KNEE, LEFT: ICD-10-CM

## 2025-02-05 DIAGNOSIS — Z12.11 COLON CANCER SCREENING: ICD-10-CM

## 2025-02-05 DIAGNOSIS — F41.9 ANXIETY: Primary | ICD-10-CM

## 2025-02-05 DIAGNOSIS — Z13.6 SCREENING FOR HEART DISEASE: ICD-10-CM

## 2025-02-05 DIAGNOSIS — Z00.00 ROUTINE GENERAL MEDICAL EXAMINATION AT A HEALTH CARE FACILITY: ICD-10-CM

## 2025-02-05 PROCEDURE — 99396 PREV VISIT EST AGE 40-64: CPT | Performed by: INTERNAL MEDICINE

## 2025-02-05 PROCEDURE — 93000 ELECTROCARDIOGRAM COMPLETE: CPT | Performed by: INTERNAL MEDICINE

## 2025-02-05 PROCEDURE — 3008F BODY MASS INDEX DOCD: CPT | Performed by: INTERNAL MEDICINE

## 2025-02-05 RX ORDER — DICLOFENAC SODIUM 10 MG/G
4 GEL TOPICAL 4 TIMES DAILY
Qty: 100 G | Refills: 1 | Status: SHIPPED | OUTPATIENT
Start: 2025-02-05

## 2025-02-05 ASSESSMENT — ENCOUNTER SYMPTOMS
OCCASIONAL FEELINGS OF UNSTEADINESS: 0
LOSS OF SENSATION IN FEET: 0
DEPRESSION: 0

## 2025-02-05 ASSESSMENT — PATIENT HEALTH QUESTIONNAIRE - PHQ9
2. FEELING DOWN, DEPRESSED OR HOPELESS: NOT AT ALL
SUM OF ALL RESPONSES TO PHQ9 QUESTIONS 1 AND 2: 0
1. LITTLE INTEREST OR PLEASURE IN DOING THINGS: NOT AT ALL

## 2025-02-05 NOTE — PROGRESS NOTES
"Subjective   Patient ID: Jaycob Caputo is a 44 y.o. male who presents for Follow-up.    HPI patient presents to clinic for routine physical examination and follow-up visit on Anxiety disorder and dyslipidemia.he is doing well except for some left knee pain and denies any cough, congestion, fever chills swelling of legs and diaphoresis.EKG done shows sinus rhythm at 75 bpm with normal axis normal interval without any ischemic changes.    Review of Systems   Constitutional: Negative.    HENT: Negative.     Eyes: Negative.    Respiratory: Negative.     Cardiovascular: Negative.    Gastrointestinal: Negative.    Endocrine: Negative.    Genitourinary: Negative.    Musculoskeletal:  Positive for arthralgias.   Skin: Negative.    Allergic/Immunologic: Negative.    Neurological: Negative.    Hematological: Negative.    Psychiatric/Behavioral: Negative.         Objective   /80 (BP Location: Left arm, Patient Position: Sitting)   Pulse 77   Ht 1.626 m (5' 4\")   Wt 74 kg (163 lb 2.3 oz)   SpO2 96%   BMI 28.00 kg/m²     Physical Exam  Constitutional:       Appearance: Normal appearance. He is normal weight.   HENT:      Right Ear: Tympanic membrane normal.      Left Ear: Tympanic membrane and ear canal normal.      Nose: Nose normal.   Neck:      Vascular: No carotid bruit.   Cardiovascular:      Rate and Rhythm: Normal rate.   Pulmonary:      Effort: No respiratory distress.      Breath sounds: No stridor. No wheezing.   Abdominal:      Palpations: Abdomen is soft.      Tenderness: There is no abdominal tenderness. There is no guarding or rebound.   Skin:     Coloration: Skin is not jaundiced.      Findings: No bruising.   Neurological:      General: No focal deficit present.      Mental Status: He is alert and oriented to person, place, and time.   Psychiatric:         Mood and Affect: Mood normal.         Assessment/Plan   Assessment & Plan  Routine general medical examination at [facility]" facility  Patient will be scheduled for routine blood work.  He will be notified about test results and will make further recommendations.  Orders:    CBC and Auto Differential; Future    Lipid Panel; Future    Comprehensive metabolic panel; Future    Urinalysis with Reflex Microscopic; Future    Anxiety  He will continue to follow-up with psychiatry clinic regarding history of anxiety disorder and continue on Klonopin.  Orders:    TSH; Future    Dyslipidemia  He is advised to follow low-cholesterol diet and will check lipid panel.  Orders:    Lipid Panel; Future    ECG 12 lead (Clinic Performed)    Arthritis of knee, left  Knee started on diclofenac gel regarding arthritis of left knee.  Orders:    diclofenac sodium (Voltaren) 1 % gel; Apply 4.5 inches (4 g) topically 4 times a day.    Benign skin mole  He will be referred to dermatology clinic regarding benign skin mole  Orders:    Referral to Dermatology    Screening for heart disease  He will be scheduled for CT cardiac scoring regarding screening for heart disease.  Orders:    CT cardiac scoring wo IV contrast; Future    Colon cancer screening  He is advised to obtain colonoscopy for colon cancer screening.  Orders:    Colonoscopy Screening; Average Risk Patient; Future

## 2025-02-06 DIAGNOSIS — D75.1 POLYCYTHEMIA: Primary | ICD-10-CM

## 2025-02-06 LAB
ALBUMIN SERPL-MCNC: 4.6 G/DL (ref 3.6–5.1)
ALP SERPL-CCNC: 71 U/L (ref 36–130)
ALT SERPL-CCNC: 44 U/L (ref 9–46)
ANION GAP SERPL CALCULATED.4IONS-SCNC: 10 MMOL/L (CALC) (ref 7–17)
APPEARANCE UR: CLEAR
AST SERPL-CCNC: 27 U/L (ref 10–40)
BACTERIA #/AREA URNS HPF: ABNORMAL /HPF
BASOPHILS # BLD AUTO: 34 CELLS/UL (ref 0–200)
BASOPHILS NFR BLD AUTO: 0.4 %
BILIRUB SERPL-MCNC: 0.7 MG/DL (ref 0.2–1.2)
BILIRUB UR QL STRIP: NEGATIVE
BUN SERPL-MCNC: 16 MG/DL (ref 7–25)
CALCIUM SERPL-MCNC: 9.7 MG/DL (ref 8.6–10.3)
CHLORIDE SERPL-SCNC: 101 MMOL/L (ref 98–110)
CHOLEST SERPL-MCNC: 173 MG/DL
CHOLEST/HDLC SERPL: 4.9 (CALC)
CO2 SERPL-SCNC: 31 MMOL/L (ref 20–32)
COLOR UR: YELLOW
CREAT SERPL-MCNC: 0.79 MG/DL (ref 0.6–1.29)
EGFRCR SERPLBLD CKD-EPI 2021: 112 ML/MIN/1.73M2
EOSINOPHIL # BLD AUTO: 68 CELLS/UL (ref 15–500)
EOSINOPHIL NFR BLD AUTO: 0.8 %
ERYTHROCYTE [DISTWIDTH] IN BLOOD BY AUTOMATED COUNT: 13 % (ref 11–15)
GLUCOSE SERPL-MCNC: 79 MG/DL (ref 65–99)
GLUCOSE UR QL STRIP: NEGATIVE
HCT VFR BLD AUTO: 52.1 % (ref 38.5–50)
HDLC SERPL-MCNC: 35 MG/DL
HGB BLD-MCNC: 17.6 G/DL (ref 13.2–17.1)
HGB UR QL STRIP: NEGATIVE
HYALINE CASTS #/AREA URNS LPF: ABNORMAL /LPF
KETONES UR QL STRIP: NEGATIVE
LDLC SERPL CALC-MCNC: 107 MG/DL (CALC)
LEUKOCYTE ESTERASE UR QL STRIP: NEGATIVE
LYMPHOCYTES # BLD AUTO: 2984 CELLS/UL (ref 850–3900)
LYMPHOCYTES NFR BLD AUTO: 35.1 %
MCH RBC QN AUTO: 29.2 PG (ref 27–33)
MCHC RBC AUTO-ENTMCNC: 33.8 G/DL (ref 32–36)
MCV RBC AUTO: 86.5 FL (ref 80–100)
MONOCYTES # BLD AUTO: 272 CELLS/UL (ref 200–950)
MONOCYTES NFR BLD AUTO: 3.2 %
NEUTROPHILS # BLD AUTO: 5143 CELLS/UL (ref 1500–7800)
NEUTROPHILS NFR BLD AUTO: 60.5 %
NITRITE UR QL STRIP: NEGATIVE
NONHDLC SERPL-MCNC: 138 MG/DL (CALC)
PH UR STRIP: 7.5 [PH] (ref 5–8)
PLATELET # BLD AUTO: 205 THOUSAND/UL (ref 140–400)
PMV BLD REES-ECKER: 10 FL (ref 7.5–12.5)
POTASSIUM SERPL-SCNC: 3.9 MMOL/L (ref 3.5–5.3)
PROT SERPL-MCNC: 7.8 G/DL (ref 6.1–8.1)
PROT UR QL STRIP: ABNORMAL
RBC # BLD AUTO: 6.02 MILLION/UL (ref 4.2–5.8)
RBC #/AREA URNS HPF: ABNORMAL /HPF
SERVICE CMNT-IMP: ABNORMAL
SODIUM SERPL-SCNC: 142 MMOL/L (ref 135–146)
SP GR UR STRIP: 1.03 (ref 1–1.03)
SQUAMOUS #/AREA URNS HPF: ABNORMAL /HPF
TRIGL SERPL-MCNC: 192 MG/DL
TSH SERPL-ACNC: 0.94 MIU/L (ref 0.4–4.5)
WBC # BLD AUTO: 8.5 THOUSAND/UL (ref 3.8–10.8)
WBC #/AREA URNS HPF: ABNORMAL /HPF

## 2025-02-07 NOTE — ASSESSMENT & PLAN NOTE
He will continue to follow-up with psychiatry clinic regarding history of anxiety disorder and continue on Klonopin.  Orders:    TSH; Future

## 2025-02-09 ASSESSMENT — ENCOUNTER SYMPTOMS
NEUROLOGICAL NEGATIVE: 1
ENDOCRINE NEGATIVE: 1
CARDIOVASCULAR NEGATIVE: 1
RESPIRATORY NEGATIVE: 1
GASTROINTESTINAL NEGATIVE: 1
ARTHRALGIAS: 1
HEMATOLOGIC/LYMPHATIC NEGATIVE: 1
EYES NEGATIVE: 1
ALLERGIC/IMMUNOLOGIC NEGATIVE: 1
PSYCHIATRIC NEGATIVE: 1
CONSTITUTIONAL NEGATIVE: 1

## 2025-02-26 ENCOUNTER — HOSPITAL ENCOUNTER (OUTPATIENT)
Dept: RADIOLOGY | Facility: HOSPITAL | Age: 45
Discharge: HOME | End: 2025-02-26
Payer: COMMERCIAL

## 2025-02-26 DIAGNOSIS — Z13.6 SCREENING FOR HEART DISEASE: ICD-10-CM

## 2025-02-26 PROCEDURE — 75571 CT HRT W/O DYE W/CA TEST: CPT

## 2025-02-28 ENCOUNTER — TELEPHONE (OUTPATIENT)
Dept: PRIMARY CARE | Facility: CLINIC | Age: 45
End: 2025-02-28
Payer: COMMERCIAL

## 2025-02-28 DIAGNOSIS — K76.0 FATTY LIVER: Primary | ICD-10-CM

## 2025-04-16 ENCOUNTER — APPOINTMENT (OUTPATIENT)
Dept: BEHAVIORAL HEALTH | Facility: CLINIC | Age: 45
End: 2025-04-16
Payer: COMMERCIAL

## 2025-04-16 DIAGNOSIS — F41.9 ANXIETY: ICD-10-CM

## 2025-04-16 PROCEDURE — 99214 OFFICE O/P EST MOD 30 MIN: CPT | Performed by: PSYCHIATRY & NEUROLOGY

## 2025-04-16 RX ORDER — CLONAZEPAM 1 MG/1
1 TABLET ORAL 2 TIMES DAILY
Qty: 60 TABLET | Refills: 2 | Status: SHIPPED | OUTPATIENT
Start: 2025-04-16 | End: 2025-07-15

## 2025-04-16 NOTE — PROGRESS NOTES
Jaycob Caputo, a 44 y.o. male, presenting to Psychiatry for follow up.  Patient is referred by Lopez Burton MD.          Virtual or Telephone Consent     An interactive audio and video telecommunication system which permits real time communications between the patient (at the originating site) and provider (at the distant site) was utilized to provide this telehealth service.   Verbal consent was requested and obtained from Jaycob Caputo on this date, 04/16/2025    Last visit: 01/22/25 for a telehealth visit.       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  April not as good  Applying for jobs - in sales as well although a change would be good  Has never had a job with the weekends off   A lot of triggers lately    Year after step brothers death  Has had some nightmares  Klonipin helps with anxiety  Edibles help with nightmares but does not want to take around family  Cannot afford to go on an international trip this year which he has been able to do for last five years because he cannot afford it right now  Working on physical health - has lost 10 lbs, resting heart rate is down  which helps with mental health   Working to eat better  Still taking the Klonipin  THC gummies - helping with nightmares - uses four times a week  Patient denies SI, HI, manic or psychotic symptoms.     Psychiatric Review Of Systems:  Depressive Symptoms: negative  Manic Symptoms: negative  Anxiety Symptoms: reduced    Psychotic Symptoms: negative  Other Symptoms:     Current Medications:  Klonipin 1 mg PO BID PRN  THC gummies at bedtime     Medical History:  Medical History   No past medical history on file.         Past Psychiatric History:   Diagnoses:   possible PTSD, anxiety, depression  Previous Psychiatrist:   Therapy:   Mika Laurent, specializes in nightmares/night time  behavior - is on leave currently, he has appointment May 1, 2024  Current psychiatric medications: none   Past psychiatric medications: Cymbalta ok; SSRIs cannot take - gets more anxious; and very depressed    Past psychiatric treatments:  unknown  Hospitalizations:none  Suicide attempts: none  Family psychiatric history: unknown     Social History:   Currently lives:  [place], lives with self, has girlfriend who has kids,  8 months who is ; she does house cleaning; he tried online dating; kids ages 14, 11 and 8    Education:   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; [employer]    History of Learning Problem: none reported  Work/Finances:  car business for 6 years; ran summer camp; [employer]; 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;    Marital history/children: none  Current stressors:  step brother passed away from has cancer  Social support: girlfriend and family   Legal History: none   History: none  Family:   age 11; parents remarried   Siblings: blood brother age 39, 4 step sisters and 2 step brother;  end stage cancer step brother age 46 have 8 year old  History of violence: none reported     Substance Use History:  Tobacco use: none  Use of alcohol:  unknown  Use of caffeine:  unknown  Use of other substances: unknown  Legal consequences of substance use: n/a  Substance use disorder treatment: n/a     Record Review: brief     Medical Review Of Systems:  Pertinent items are noted in HPI.     OARRS:  No data recorded  I have personally reviewed the OARRS report for Jaycob Caputo. I have considered the risks of abuse, dependence, addiction and diversion and I believe that it is clinically appropriate for Jaycob Caputo to be prescribed this medication     Is the patient prescribed a combination of a benzodiazepine and opioid?  No     Last Urine Drug Screen:                 Lab on 06/10/2024   Component  Date Value Ref Range Status    Amphetamine Screen, Urine 06/10/2024 Presumptive Negative  Presumptive Negative Final     CUTOFF LEVEL: 500 NG/ML   Cross-reactivity has been reported with high concentrations   of the following drugs: buproprion, chloroquine, chlorpromazine,   ephedrine, mephentermine, fenfluramine, phentermine,   phenylpropanolamine, pseudoephedrine, and propranolol.    Barbiturate Screen, Urine 06/10/2024 Presumptive Negative  Presumptive Negative Final     CUTOFF LEVEL: 200 NG/ML    Benzodiazepines Screen, Urine 06/10/2024 Presumptive Negative  Presumptive Negative Final     CUTOFF LEVEL: 200 NG/ML    Cannabinoid Screen, Urine 06/10/2024 Presumptive Positive (A)  Presumptive Negative Final     CUTOFF LEVEL: 50 NG/ML    Cocaine Metabolite Screen, Urine 06/10/2024 Presumptive Negative  Presumptive Negative Final     CUTOFF LEVEL: 150 NG/ML    Fentanyl Screen, Urine 06/10/2024 Presumptive Negative  Presumptive Negative Final     CUTOFF LEVEL: 5 NG/ML    Opiate Screen, Urine 06/10/2024 Presumptive Negative  Presumptive Negative Final     CUTOFF LEVEL: 300 NG/ML  The opiate screen does not detect fentanyl, meperidine, or   tramadol. Oxycodone is not consistently detected (refer to  Oxycodone Screen, Urine result).    Oxycodone Screen, Urine 06/10/2024 Presumptive Negative  Presumptive Negative Final     CUTOFF LEVEL: 100 NG/ML  This test will accurately detect both oxycodone and oxymorphone.    PCP Screen, Urine 06/10/2024 Presumptive Negative  Presumptive Negative Final     CUTOFF LEVEL:  25 NG/ML  Cross-reactivity has been reported with dextromethorphan.    Methadone Screen, Urine 06/10/2024 Presumptive Negative  Presumptive Negative Final     CUTOFF LEVEL: 150 NG/ML  The metabolite L-alpha-acetylmethadol (LAAM) is not  detected by this method in concentrations that would  be found in the urine of patients on LAAM therapy.    37-Zek-9-carboxy-THC, Urn, Quant 06/10/2024 60  ng/mL Final     Comment:  "INTERPRETIVE INFORMATION: THC Metabolite, Urine,                             Quantitative     Methodology: Quantitative Liquid Chromatography-Tandem Mass   Spectrometry  Positive cutoff: 15 ng/mL  For medical purposes only; not valid for forensic use.  The drug analyte detected in this assay, 9-carboxy THC, is a   metabolite of delta-9-tetrahydrocannabinol (THC). Detection of   9-carboxy THC suggests use of, or exposure to, a product   containing THC.  This test cannot distinguish between prescribed   or non-prescribed forms of THC, nor can it distinguish between   active or passive use. The 9-carboxy THC metabolite can be   detected in urine for several weeks. Normalization of results to   creatinine concentration can help document elimination or suggest   recent use, when specimens are collected at least one week apart.     This test was developed and its performance characteristics   determined by [facility]. It has not been cleared or   approved by the [place] Food and Drug                             Administration. This test was   performed in a CLIA certified laboratory and is intended for   clinical purposes.  Performed By: [facility]  [address] [place] [id number]  : Jaycob Rodney MD, PhD  IA Number: [id number]         Controlled Substance Agreement:  signed today      Activities of Daily Living:   Is your overall impression that this patient is benefiting (symptom reduction outweighs side effects) from benzodiazepine therapy? Yes        Objective   Mental Status Exam  Appearance: casually dressed, good g/h  Attitude: Calm, cooperative, and engaged in conversation.  Behavior: Appropriate eye contact.   Motor Activity: No psychomotor agitation or retardation. No abnormal movements, tremors or tics. No evidence of extrapyramidal symptoms or tardive dyskinesia.  Speech: Regular rate, rhythm, volume. Spontaneous, no pressured speech.  Mood: \"okay\"  " euthymic  Affect: full range, mood congruent.  Thought Process: Linear, logical, and goal-directed. No loose associations or gross thought disorganization.  Thought Content: Denied current suicidal ideation or thoughts of harm to self, denied homicidal ideation or thoughts of harm to others. No delusional thinking elicited. No perseverations or obsessions identified.   Perception: Did not endorse auditory or visual hallucinations, did not appear to be responding to hallucinatory stimuli.   Cognition: Alert, oriented x3. Preserved attention span and concentration, recent and remote memory. Adequate fund of knowledge. No deficits in language.   Insight: Fair, in regards to understanding mental health condition  Judgement: Fair              Vitals:     05/08/24 1020   BP: (!) 130/91   Pulse: 75   Resp: 16   Temp: 36.7 °C (98.1 °F)         Risk Assessment:     Risk of harm to self: low     Risk of harm to others: low      DXS:   Anxiety unspecified  MDD, recurrent     Assessment:  Patient is a 44 year old  male  with a history of depression and anxiety who had a depressive episode last month but is now feeling better. He is hopeful that things will improve at work.  He is still struggling with tendonitis and has a lot of pain.       Still does not want to go back on Trintellix.      Patient denies SI, HI, manic or psychotic symptoms.  No side effects reported.        Plan/Recommendations:  Medications: continue Klonipin 1 mg PO daily and PRN at bedtime anxiety/sleep  Orders: continue therapy  Follow up:  12 weeks  Call  Psychiatry at [phone] with issues.  For [facility] residents, [employer] is a 24/7 hotline you can call for assistance [[phone]]. Please call [phone] or go to your closest Emergency Room if you feel unsafe. This includes thoughts of hurting yourself or anyone else, or having other troubles such as hearing voices, seeing visions, or having new and scary thoughts about the people  around you.     Review with patient: Treatment plan reviewed with the patient.  Medication risks/benefit reviewed with the patient

## 2025-04-23 ENCOUNTER — OFFICE VISIT (OUTPATIENT)
Dept: HEMATOLOGY/ONCOLOGY | Facility: CLINIC | Age: 45
End: 2025-04-23
Payer: COMMERCIAL

## 2025-04-23 VITALS
SYSTOLIC BLOOD PRESSURE: 113 MMHG | RESPIRATION RATE: 18 BRPM | HEART RATE: 65 BPM | WEIGHT: 159.28 LBS | BODY MASS INDEX: 27.19 KG/M2 | TEMPERATURE: 97.3 F | OXYGEN SATURATION: 96 % | HEIGHT: 64 IN | DIASTOLIC BLOOD PRESSURE: 73 MMHG

## 2025-04-23 DIAGNOSIS — D75.1 POLYCYTHEMIA: Primary | ICD-10-CM

## 2025-04-23 LAB
ALBUMIN SERPL BCP-MCNC: 4.3 G/DL (ref 3.4–5)
ALP SERPL-CCNC: 68 U/L (ref 33–120)
ALT SERPL W P-5'-P-CCNC: 30 U/L (ref 10–52)
ANION GAP SERPL CALC-SCNC: 11 MMOL/L (ref 10–20)
AST SERPL W P-5'-P-CCNC: 25 U/L (ref 9–39)
BASOPHILS # BLD AUTO: 0.02 X10*3/UL (ref 0–0.1)
BASOPHILS NFR BLD AUTO: 0.3 %
BILIRUB SERPL-MCNC: 0.7 MG/DL (ref 0–1.2)
BUN SERPL-MCNC: 12 MG/DL (ref 6–23)
CALCIUM SERPL-MCNC: 9.1 MG/DL (ref 8.6–10.3)
CHLORIDE SERPL-SCNC: 103 MMOL/L (ref 98–107)
CO2 SERPL-SCNC: 28 MMOL/L (ref 21–32)
CREAT SERPL-MCNC: 0.68 MG/DL (ref 0.5–1.3)
EGFRCR SERPLBLD CKD-EPI 2021: >90 ML/MIN/1.73M*2
EOSINOPHIL # BLD AUTO: 0.11 X10*3/UL (ref 0–0.7)
EOSINOPHIL NFR BLD AUTO: 1.5 %
ERYTHROCYTE [DISTWIDTH] IN BLOOD BY AUTOMATED COUNT: 12.8 % (ref 11.5–14.5)
FERRITIN SERPL-MCNC: 324 NG/ML (ref 20–300)
GLUCOSE SERPL-MCNC: 93 MG/DL (ref 74–99)
HCT VFR BLD AUTO: 46.4 % (ref 41–52)
HGB BLD-MCNC: 16.3 G/DL (ref 13.5–17.5)
IMM GRANULOCYTES # BLD AUTO: 0.03 X10*3/UL (ref 0–0.7)
IMM GRANULOCYTES NFR BLD AUTO: 0.4 % (ref 0–0.9)
IRON SATN MFR SERPL: 39 % (ref 25–45)
IRON SERPL-MCNC: 113 UG/DL (ref 35–150)
LYMPHOCYTES # BLD AUTO: 2.38 X10*3/UL (ref 1.2–4.8)
LYMPHOCYTES NFR BLD AUTO: 32.4 %
MCH RBC QN AUTO: 29.5 PG (ref 26–34)
MCHC RBC AUTO-ENTMCNC: 35.1 G/DL (ref 32–36)
MCV RBC AUTO: 84 FL (ref 80–100)
MONOCYTES # BLD AUTO: 0.27 X10*3/UL (ref 0.1–1)
MONOCYTES NFR BLD AUTO: 3.7 %
NEUTROPHILS # BLD AUTO: 4.54 X10*3/UL (ref 1.2–7.7)
NEUTROPHILS NFR BLD AUTO: 61.7 %
NRBC BLD-RTO: 0 /100 WBCS (ref 0–0)
PLATELET # BLD AUTO: 175 X10*3/UL (ref 150–450)
POTASSIUM SERPL-SCNC: 3.8 MMOL/L (ref 3.5–5.3)
PROT SERPL-MCNC: 7.2 G/DL (ref 6.4–8.2)
RBC # BLD AUTO: 5.53 X10*6/UL (ref 4.5–5.9)
SODIUM SERPL-SCNC: 138 MMOL/L (ref 136–145)
TESTOST SERPL-MCNC: 466 NG/DL (ref 240–1000)
TIBC SERPL-MCNC: 291 UG/DL (ref 240–445)
UIBC SERPL-MCNC: 178 UG/DL (ref 110–370)
WBC # BLD AUTO: 7.4 X10*3/UL (ref 4.4–11.3)

## 2025-04-23 PROCEDURE — 82728 ASSAY OF FERRITIN: CPT | Performed by: NURSE PRACTITIONER

## 2025-04-23 PROCEDURE — 85025 COMPLETE CBC W/AUTO DIFF WBC: CPT | Performed by: NURSE PRACTITIONER

## 2025-04-23 PROCEDURE — 99204 OFFICE O/P NEW MOD 45 MIN: CPT | Performed by: NURSE PRACTITIONER

## 2025-04-23 PROCEDURE — 84075 ASSAY ALKALINE PHOSPHATASE: CPT | Performed by: NURSE PRACTITIONER

## 2025-04-23 PROCEDURE — 3008F BODY MASS INDEX DOCD: CPT | Performed by: NURSE PRACTITIONER

## 2025-04-23 PROCEDURE — 99214 OFFICE O/P EST MOD 30 MIN: CPT | Performed by: NURSE PRACTITIONER

## 2025-04-23 PROCEDURE — 84403 ASSAY OF TOTAL TESTOSTERONE: CPT | Performed by: NURSE PRACTITIONER

## 2025-04-23 PROCEDURE — 83540 ASSAY OF IRON: CPT | Performed by: NURSE PRACTITIONER

## 2025-04-23 ASSESSMENT — PAIN SCALES - GENERAL: PAINLEVEL_OUTOF10: 4

## 2025-04-23 NOTE — PROGRESS NOTES
"Patient ID: Jaycob Caputo is a 44 y.o. male.  Referring Physician: Kyle Oliveros MD  [address]  [place] [id number]  Primary Care Provider: @PCP@  Visit Type: Initial Visit      Subjective    HPI  43yo referred for evaluation of polycythemia with labs 2/5/25 WBC 8.5, hgb 17.56 and plt 205.   He was noted to have hgb 17.7 also on 12/15/21.   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  He has been told he snores.  He does not spend time at high altitude.  He does not know if MVI has iron in it.      Review of Systems - Oncology Asymptomatic    Objective   BSA: 1.81 meters squared  /73   Pulse 65   Temp 36.3 °C (97.3 °F) (Temporal)   Resp 18   Ht 1.625 m (5' 3.98\")   Wt 72.3 kg (159 lb 4.5 oz)   SpO2 96%   BMI 27.36 kg/m²      has no past medical history on file.   has no past surgical history on file.  Family History[1]      Jaycob Caputo  reports that he has never smoked. He has never been exposed to tobacco smoke. He has never used smokeless tobacco.  He  reports current alcohol use of about 4.0 - 5.0 standard drinks of alcohol per week.  He  reports current drug use. Drug: Marijuana.    Physical Exam  Constitutional:       Appearance: Normal appearance.   Eyes:      Conjunctiva/sclera: Conjunctivae normal.   Cardiovascular:      Rate and Rhythm: Normal rate and regular rhythm.      Pulses: Normal pulses.      Heart sounds: Normal heart sounds.   Pulmonary:      Effort: Pulmonary effort is normal. No respiratory distress.      Breath sounds: Normal breath sounds. No wheezing, rhonchi or rales.   Abdominal:      General: There is no distension.      Palpations: Abdomen is soft. There is no mass.      Tenderness: There is no abdominal tenderness.   Musculoskeletal:         General: Normal range of motion.   Lymphadenopathy:      Cervical: No cervical adenopathy.   Skin:     " Coloration: Skin is not jaundiced or pale.      Findings: No bruising.   Neurological:      Motor: No weakness.     WBC   Date/Time Value Ref Range Status   04/23/2025 10:21 AM 7.4 4.4 - 11.3 x10*3/uL Final   01/10/2024 03:09 PM 9.5 4.4 - 11.3 x10*3/uL Final   12/15/2021 11:53 AM 8.7 4.4 - 11.3 x10E9/L Final   12/09/2020 10:39 AM 6.6 4.4 - 11.3 x10E9/L Final     WHITE BLOOD CELL COUNT   Date/Time Value Ref Range Status   02/05/2025 03:23 PM 8.5 3.8 - 10.8 Thousand/uL Final     nRBC   Date Value Ref Range Status   04/23/2025 0.0 0.0 - 0.0 /100 WBCs Final   01/10/2024 0.0 0.0 - 0.0 /100 WBCs Final   12/15/2021 0.0 0.0 - 0.0 /100 WBC Final   12/09/2020 0.0 0.0 - 0.0 /100 WBC Final     RBC   Date Value Ref Range Status   04/23/2025 5.53 4.50 - 5.90 x10*6/uL Final   01/10/2024 5.67 4.50 - 5.90 x10*6/uL Final   12/15/2021 5.94 (H) 4.50 - 5.90 x10E12/L Final   12/09/2020 5.52 4.50 - 5.90 x10E12/L Final     RED BLOOD CELL COUNT   Date Value Ref Range Status   02/05/2025 6.02 (H) 4.20 - 5.80 Million/uL Final     Hemoglobin   Date Value Ref Range Status   04/23/2025 16.3 13.5 - 17.5 g/dL Final   01/10/2024 16.4 13.5 - 17.5 g/dL Final   12/15/2021 17.7 (H) 13.5 - 17.5 g/dL Final   12/09/2020 16.7 13.5 - 17.5 g/dL Final     HEMOGLOBIN   Date Value Ref Range Status   02/05/2025 17.6 (H) 13.2 - 17.1 g/dL Final     Hematocrit   Date Value Ref Range Status   04/23/2025 46.4 41.0 - 52.0 % Final   01/10/2024 48.8 41.0 - 52.0 % Final   12/15/2021 52.7 (H) 41.0 - 52.0 % Final   12/09/2020 50.4 41.0 - 52.0 % Final     HEMATOCRIT   Date Value Ref Range Status   02/05/2025 52.1 (H) 38.5 - 50.0 % Final     MCV   Date/Time Value Ref Range Status   04/23/2025 10:21 AM 84 80 - 100 fL Final   02/05/2025 03:23 PM 86.5 80.0 - 100.0 fL Final   01/10/2024 03:09 PM 86 80 - 100 fL Final   12/15/2021 11:53 AM 89 80 - 100 fL Final   12/09/2020 10:39 AM 91 80 - 100 fL Final     MCH   Date/Time Value Ref Range Status   04/23/2025 10:21 AM 29.5 26.0 -  34.0 pg Final   02/05/2025 03:23 PM 29.2 27.0 - 33.0 pg Final   01/10/2024 03:09 PM 28.9 26.0 - 34.0 pg Final     MCHC   Date/Time Value Ref Range Status   04/23/2025 10:21 AM 35.1 32.0 - 36.0 g/dL Final   02/05/2025 03:23 PM 33.8 32.0 - 36.0 g/dL Final     Comment:     For adults, a slight decrease in the calculated MCHC  value (in the range of 30 to 32 g/dL) is most likely  not clinically significant; however, it should be  interpreted with caution in correlation with other  red cell parameters and the patient's clinical  condition.     01/10/2024 03:09 PM 33.6 32.0 - 36.0 g/dL Final   12/15/2021 11:53 AM 33.6 32.0 - 36.0 g/dL Final   12/09/2020 10:39 AM 33.1 32.0 - 36.0 g/dL Final     RDW   Date/Time Value Ref Range Status   04/23/2025 10:21 AM 12.8 11.5 - 14.5 % Final   02/05/2025 03:23 PM 13.0 11.0 - 15.0 % Final   01/10/2024 03:09 PM 13.0 11.5 - 14.5 % Final   12/15/2021 11:53 AM 12.6 11.5 - 14.5 % Final   12/09/2020 10:39 AM 12.8 11.5 - 14.5 % Final     Platelets   Date/Time Value Ref Range Status   04/23/2025 10:21  150 - 450 x10*3/uL Final   01/10/2024 03:09  150 - 450 x10*3/uL Final   12/15/2021 11:53  150 - 450 x10E9/L Final   12/09/2020 10:39  150 - 450 x10E9/L Final     PLATELET COUNT   Date/Time Value Ref Range Status   02/05/2025 03:23  140 - 400 Thousand/uL Final     MPV   Date/Time Value Ref Range Status   02/05/2025 03:23 PM 10.0 7.5 - 12.5 fL Final     Neutrophils %   Date/Time Value Ref Range Status   04/23/2025 10:21 AM 61.7 40.0 - 80.0 % Final     Immature Granulocytes %, Automated   Date/Time Value Ref Range Status   04/23/2025 10:21 AM 0.4 0.0 - 0.9 % Final     Comment:     Immature Granulocyte Count (IG) includes promyelocytes, myelocytes and metamyelocytes but does not include bands. Percent differential counts (%) should be interpreted in the context of the absolute cell counts (cells/UL).     Lymphocytes %   Date/Time Value Ref Range Status   04/23/2025 10:21  AM 32.4 13.0 - 44.0 % Final     LYMPHOCYTES   Date/Time Value Ref Range Status   02/05/2025 03:23 PM 35.1 % Final     Monocytes %   Date/Time Value Ref Range Status   04/23/2025 10:21 AM 3.7 2.0 - 10.0 % Final     MONOCYTES   Date/Time Value Ref Range Status   02/05/2025 03:23 PM 3.2 % Final     Eosinophils %   Date/Time Value Ref Range Status   04/23/2025 10:21 AM 1.5 0.0 - 6.0 % Final     EOSINOPHILS   Date/Time Value Ref Range Status   02/05/2025 03:23 PM 0.8 % Final     Basophils %   Date/Time Value Ref Range Status   04/23/2025 10:21 AM 0.3 0.0 - 2.0 % Final     BASOPHILS   Date/Time Value Ref Range Status   02/05/2025 03:23 PM 0.4 % Final     Neutrophils Absolute   Date/Time Value Ref Range Status   04/23/2025 10:21 AM 4.54 1.20 - 7.70 x10*3/uL Final     Comment:     Percent differential counts (%) should be interpreted in the context of the absolute cell counts (cells/uL).     Immature Granulocytes Absolute, Automated   Date/Time Value Ref Range Status   04/23/2025 10:21 AM 0.03 0.00 - 0.70 x10*3/uL Final     Lymphocytes Absolute   Date/Time Value Ref Range Status   04/23/2025 10:21 AM 2.38 1.20 - 4.80 x10*3/uL Final     Monocytes Absolute   Date/Time Value Ref Range Status   04/23/2025 10:21 AM 0.27 0.10 - 1.00 x10*3/uL Final     Eosinophils Absolute   Date/Time Value Ref Range Status   04/23/2025 10:21 AM 0.11 0.00 - 0.70 x10*3/uL Final     ABSOLUTE EOSINOPHILS   Date/Time Value Ref Range Status   02/05/2025 03:23 PM 68 15 - 500 cells/uL Final     Basophils Absolute   Date/Time Value Ref Range Status   04/23/2025 10:21 AM 0.02 0.00 - 0.10 x10*3/uL Final     ABSOLUTE BASOPHILS   Date/Time Value Ref Range Status   02/05/2025 03:23 PM 34 0 - 200 cells/uL Final         Assessment/Plan    Polycythemia most likely related to sleep apnea.  Will do referral to sleep study   Will check other potential causative factors and see him back in 6 months  Will call with results     Diagnoses and all orders for this  visit:  Polycythemia  -     Referral To Hematology and Oncology  -     CBC and Auto Differential; Future  -     Comprehensive Metabolic Panel; Future  -     Ferritin; Future  -     Iron and TIBC; Future  -     Testosterone; Future  -     Myeloid Malignancies Panel; Future  -     Referral to Adult Sleep Medicine; Future  -     Clinic Appointment Request Follow up; Future  -     CBC and Auto Differential; Future           Nano Cevallos PA-C                              [1]   Family History  Problem Relation Name Age of Onset    Skin cancer Mother      Hypertension Father      Skin cancer Father      Hypothyroidism Brother      Heart attack Paternal Grandfather

## 2025-04-29 LAB
ELECTRONICALLY SIGNED BY: NORMAL
MYELOID NGS RESULTS: NORMAL

## 2025-04-30 DIAGNOSIS — R79.89 ELEVATED FERRITIN: Primary | ICD-10-CM

## 2025-05-05 LAB
ELECTRONICALLY SIGNED BY: NORMAL
HFE GENE MUT TESTED BLD/T: NORMAL
HFE P.C282Y BLD/T QL: NORMAL
HFE P.H63D BLD/T QL: NORMAL

## 2025-05-20 ENCOUNTER — HOSPITAL ENCOUNTER (OUTPATIENT)
Dept: RADIOLOGY | Facility: CLINIC | Age: 45
Discharge: HOME | End: 2025-05-20
Payer: COMMERCIAL

## 2025-05-20 ENCOUNTER — OFFICE VISIT (OUTPATIENT)
Dept: URGENT CARE | Age: 45
End: 2025-05-20
Payer: COMMERCIAL

## 2025-05-20 VITALS
SYSTOLIC BLOOD PRESSURE: 154 MMHG | TEMPERATURE: 97.9 F | HEART RATE: 91 BPM | RESPIRATION RATE: 18 BRPM | OXYGEN SATURATION: 95 % | DIASTOLIC BLOOD PRESSURE: 88 MMHG

## 2025-05-20 DIAGNOSIS — S99.922A INJURY OF LEFT FOOT, INITIAL ENCOUNTER: ICD-10-CM

## 2025-05-20 DIAGNOSIS — S93.602A SPRAIN OF LEFT FOOT, INITIAL ENCOUNTER: ICD-10-CM

## 2025-05-20 DIAGNOSIS — S99.922A INJURY OF LEFT FOOT, INITIAL ENCOUNTER: Primary | ICD-10-CM

## 2025-05-20 PROCEDURE — 73630 X-RAY EXAM OF FOOT: CPT | Mod: LEFT SIDE | Performed by: RADIOLOGY

## 2025-05-20 PROCEDURE — L4387 NON-PNEUM WALK BOOT PRE OTS: HCPCS | Performed by: FAMILY MEDICINE

## 2025-05-20 PROCEDURE — 73630 X-RAY EXAM OF FOOT: CPT | Performed by: FAMILY MEDICINE

## 2025-05-20 PROCEDURE — 73630 X-RAY EXAM OF FOOT: CPT | Mod: LT

## 2025-05-20 PROCEDURE — 1036F TOBACCO NON-USER: CPT | Performed by: FAMILY MEDICINE

## 2025-05-20 PROCEDURE — 99214 OFFICE O/P EST MOD 30 MIN: CPT | Performed by: FAMILY MEDICINE

## 2025-05-20 ASSESSMENT — PAIN SCALES - GENERAL: PAINLEVEL_OUTOF10: 3

## 2025-05-20 NOTE — PROGRESS NOTES
Subjective   Patient ID: Jaycob Caputo is a 44 y.o. male. They present today with a chief complaint of Injury (Left foot at work x today /Swollen and painful to walk ).    History of Present Illness  HPI    44-year-old male here for work-related injury today.  Patient states he was standing on a pallet while talking to a coworker when he twisted his left foot as he was stepping off of it.  Patient states initially did not have pain but progressively got worse such that by the end of his workday today the pain was unbearable.  He points to the top of his left foot as source of pain and it is diffuse.    Past Medical History  Allergies as of 05/20/2025 - Reviewed 05/20/2025   Allergen Reaction Noted    Lamotrigine Rash 07/23/2013       Prescriptions Prior to Admission[1]     Medical History[2]    Surgical History[3]     reports that he has never smoked. He has never been exposed to tobacco smoke. He has never used smokeless tobacco. He reports current alcohol use of about 4.0 - 5.0 standard drinks of alcohol per week. He reports current drug use. Drug: Marijuana.    Review of Systems  Review of Systems                               Objective    Vitals:    05/20/25 1832   BP: 154/88   BP Location: Right arm   Patient Position: Sitting   Pulse: 91   Resp: 18   Temp: 36.6 °C (97.9 °F)   SpO2: 95%     No LMP for male patient.    Physical Exam    Constitutional: Vital signs reviewed. Patient alert and without distress.    Cardiovascular: Pedal pulses normal, normal capillary refill. No peripheral edema.    Skin: Left foot swelling without redness or warmth.  No lacerations, abrasions, bruises or lesions noted. Nails intact without signs of injury.     Neurologic: Cortical function: Normal. Sensation: Normal. Motor: Normal.  Coordination: Antalgic gait.    Musculoskeletal: LLE    Tib-fib, calf muscle, Achilles tendon intact. Normal Grigsby test.    Medial and lateral malleoli intact.     Foot bones diffusely tender  including the base of the 5th metatarsal and navicular.  Soft tissue swelling noted dorsally.  No skin tenting or deformity.    No other effusion, skin tenting or deformity.     Procedures    Point of Care Test & Imaging Results from this visit  No results found for this visit on 05/20/25.   Imaging  No results found.    Cardiology, Vascular, and Other Imaging  No other imaging results found for the past 2 days      Diagnostic study results (if any) were reviewed by Paz Rangel MD.    Prelim: No acute fracture or dislocation of the left foot.  Tip of a hardware noted on the distal end of the tibia.    Assessment/Plan   Allergies, medications, history, and pertinent labs/EKGs/Imaging reviewed by Paz Rangel MD.     Medical Decision Making  My preliminary reading of the left foot x-ray is that bones are unremarkable without acute fracture or dislocation.  Hardware noted at the distal end of the tibia.  Patient states history of tibial fracture with hardware placement this was years ago.  He has recently seen orthopedics and had MRI done which showed normal placement.     Orders and Diagnoses  Diagnoses and all orders for this visit:  Injury of left foot, initial encounter  -     XR foot left 3+ views; Future  Sprain of left foot, initial encounter  -     Walking boot, pneumatic      Medical Admin Record      Patient disposition: Home    Electronically signed by Paz Rangel MD  8:35 PM           [1] (Not in a hospital admission)   [2] No past medical history on file.  [3] No past surgical history on file.

## 2025-05-21 NOTE — PATIENT INSTRUCTIONS
Go to the emergency department for severe symptoms.    Follow-up with occupational health or foot and ankle orthopedics within 7 to 10 days.    Ice, wrapped in a towel, 20 minutes on/20 minutes off while awake within the first 48 hours of the injury afterwards, OK to apply heat on/off as needed for comfort. Elevate the affected area on/off as needed for comfort.    Wear the walking boot when ambulating in the meantime.    Ibuprofen 200 mg (Advil or Motrin), 2 tablets with food every 6 hours as needed for fever or pain.    Acetaminophen 500 mg (Extra StrengthTylenol), 2 tablets every 6 hours as needed for fever or pain.

## 2025-07-08 ENCOUNTER — ANESTHESIA EVENT (OUTPATIENT)
Dept: GASTROENTEROLOGY | Facility: HOSPITAL | Age: 45
End: 2025-07-08
Payer: COMMERCIAL

## 2025-07-08 PROBLEM — E78.5 HYPERLIPIDEMIA: Status: ACTIVE | Noted: 2025-07-08

## 2025-07-08 PROBLEM — I10 HYPERTENSION: Status: ACTIVE | Noted: 2025-07-08

## 2025-07-09 ENCOUNTER — ANESTHESIA (OUTPATIENT)
Dept: GASTROENTEROLOGY | Facility: HOSPITAL | Age: 45
End: 2025-07-09
Payer: COMMERCIAL

## 2025-07-09 ENCOUNTER — HOSPITAL ENCOUNTER (OUTPATIENT)
Dept: GASTROENTEROLOGY | Facility: HOSPITAL | Age: 45
Discharge: HOME | End: 2025-07-09
Payer: COMMERCIAL

## 2025-07-09 VITALS
HEART RATE: 63 BPM | TEMPERATURE: 97.2 F | HEIGHT: 63 IN | SYSTOLIC BLOOD PRESSURE: 141 MMHG | RESPIRATION RATE: 16 BRPM | DIASTOLIC BLOOD PRESSURE: 78 MMHG | BODY MASS INDEX: 27.34 KG/M2 | OXYGEN SATURATION: 96 % | WEIGHT: 154.32 LBS

## 2025-07-09 DIAGNOSIS — Z12.11 COLON CANCER SCREENING: ICD-10-CM

## 2025-07-09 PROBLEM — T41.45XA ADVERSE EFFECT OF ANESTHESIA: Status: ACTIVE | Noted: 2025-07-09

## 2025-07-09 PROCEDURE — 45378 DIAGNOSTIC COLONOSCOPY: CPT | Performed by: INTERNAL MEDICINE

## 2025-07-09 PROCEDURE — 3700000002 HC GENERAL ANESTHESIA TIME - EACH INCREMENTAL 1 MINUTE

## 2025-07-09 PROCEDURE — 3700000001 HC GENERAL ANESTHESIA TIME - INITIAL BASE CHARGE

## 2025-07-09 PROCEDURE — A45378 PR COLONOSCOPY,DIAGNOSTIC: Performed by: NURSE ANESTHETIST, CERTIFIED REGISTERED

## 2025-07-09 PROCEDURE — 2500000004 HC RX 250 GENERAL PHARMACY W/ HCPCS (ALT 636 FOR OP/ED): Performed by: NURSE ANESTHETIST, CERTIFIED REGISTERED

## 2025-07-09 PROCEDURE — A45378 PR COLONOSCOPY,DIAGNOSTIC: Performed by: ANESTHESIOLOGY

## 2025-07-09 PROCEDURE — 7100000009 HC PHASE TWO TIME - INITIAL BASE CHARGE

## 2025-07-09 PROCEDURE — 7100000010 HC PHASE TWO TIME - EACH INCREMENTAL 1 MINUTE

## 2025-07-09 RX ORDER — DEXMEDETOMIDINE IN 0.9 % NACL 20 MCG/5ML
SYRINGE (ML) INTRAVENOUS AS NEEDED
Status: DISCONTINUED | OUTPATIENT
Start: 2025-07-09 | End: 2025-07-09

## 2025-07-09 RX ORDER — PROPOFOL 10 MG/ML
INJECTION, EMULSION INTRAVENOUS AS NEEDED
Status: DISCONTINUED | OUTPATIENT
Start: 2025-07-09 | End: 2025-07-09

## 2025-07-09 RX ORDER — LIDOCAINE HYDROCHLORIDE 20 MG/ML
INJECTION, SOLUTION EPIDURAL; INFILTRATION; INTRACAUDAL; PERINEURAL AS NEEDED
Status: DISCONTINUED | OUTPATIENT
Start: 2025-07-09 | End: 2025-07-09

## 2025-07-09 RX ORDER — MIDAZOLAM HYDROCHLORIDE 1 MG/ML
INJECTION INTRAMUSCULAR; INTRAVENOUS CONTINUOUS PRN
Status: DISCONTINUED | OUTPATIENT
Start: 2025-07-09 | End: 2025-07-09

## 2025-07-09 RX ADMIN — PROPOFOL 50 MG: 10 INJECTION, EMULSION INTRAVENOUS at 07:50

## 2025-07-09 RX ADMIN — PROPOFOL 250 MG: 10 INJECTION, EMULSION INTRAVENOUS at 07:38

## 2025-07-09 RX ADMIN — LIDOCAINE HYDROCHLORIDE 60 MG: 20 INJECTION, SOLUTION EPIDURAL; INFILTRATION; INTRACAUDAL; PERINEURAL at 07:35

## 2025-07-09 RX ADMIN — PROPOFOL 50 MG: 10 INJECTION, EMULSION INTRAVENOUS at 07:37

## 2025-07-09 RX ADMIN — Medication 12 MCG: at 07:38

## 2025-07-09 RX ADMIN — PROPOFOL 100 MG: 10 INJECTION, EMULSION INTRAVENOUS at 07:35

## 2025-07-09 ASSESSMENT — PAIN - FUNCTIONAL ASSESSMENT
PAIN_FUNCTIONAL_ASSESSMENT: 0-10
PAIN_FUNCTIONAL_ASSESSMENT: 0-10
PAIN_FUNCTIONAL_ASSESSMENT: UNABLE TO SELF-REPORT
PAIN_FUNCTIONAL_ASSESSMENT: 0-10
PAIN_FUNCTIONAL_ASSESSMENT: UNABLE TO SELF-REPORT
PAIN_FUNCTIONAL_ASSESSMENT: 0-10

## 2025-07-09 ASSESSMENT — COLUMBIA-SUICIDE SEVERITY RATING SCALE - C-SSRS
6. HAVE YOU EVER DONE ANYTHING, STARTED TO DO ANYTHING, OR PREPARED TO DO ANYTHING TO END YOUR LIFE?: NO
2. HAVE YOU ACTUALLY HAD ANY THOUGHTS OF KILLING YOURSELF?: NO
1. IN THE PAST MONTH, HAVE YOU WISHED YOU WERE DEAD OR WISHED YOU COULD GO TO SLEEP AND NOT WAKE UP?: NO

## 2025-07-09 ASSESSMENT — PAIN SCALES - GENERAL
PAINLEVEL_OUTOF10: 0 - NO PAIN

## 2025-07-09 NOTE — H&P
"History Of Present Illness  Jaycob Caputo is a 44 y.o. male presenting with screening.     Past Medical History  Medical History[1]  Surgical History  Surgical History[2]  Social History  He reports that he has never smoked. He has never been exposed to tobacco smoke. He has never used smokeless tobacco. He reports current alcohol use of about 1.0 - 2.0 standard drink of alcohol per week. He reports current drug use. Frequency: 5.00 times per week. Drug: Marijuana.    Family History  Family History[3]     Allergies  Allergies[4]  Review of Systems     Physical Exam  Vitals reviewed.   Constitutional:       Appearance: Normal appearance.   Neurological:      Mental Status: He is alert.          Last Recorded Vitals  Blood pressure 146/85, pulse 77, resp. rate 15, height 1.6 m (5' 3\"), weight 70 kg (154 lb 5.2 oz), SpO2 97%.    Assessment/Plan   R/O neoplasm for colonoscopy     Vicente Gray MD       [1]   Past Medical History:  Diagnosis Date    Anxiety     Depression    [2]   Past Surgical History:  Procedure Laterality Date    OTHER SURGICAL HISTORY Left     attempt to remove isiah from leg   [3]   Family History  Problem Relation Name Age of Onset    Skin cancer Mother      Hypertension Father      Skin cancer Father      Hypothyroidism Brother      Cancer Maternal Grandmother Rosi     Colon cancer Maternal Grandmother Rosi     Heart attack Paternal Grandfather     [4]   Allergies  Allergen Reactions    Lamotrigine Rash     "

## 2025-07-09 NOTE — ANESTHESIA PREPROCEDURE EVALUATION
"Patient: Jaycob Caputo    Procedure Information       Date/Time: 07/09/25 0730    Scheduled providers: Vicente Gray MD; Adriana Sheth MD; SOFIA Holland    Procedure: COLONOSCOPY    Location: [facility]                                                           Pre-Anesthesia Evaluation      Jaycob Caputo is a 44 y.o. male who presents for the above mentioned procedure due to Colon cancer screening [Z12.11]       Medical History[1]  Surgical History[2]  Social History[3]  RX Allergies[4]  Current Medications[5]  Prior to Admission medications    Medication Sig Start Date End Date Taking? Authorizing Provider   clonazePAM (KlonoPIN) 1 mg tablet Take 1 tablet (1 mg) by mouth 2 times a day. 4/16/25 7/15/25 Yes Nano Owens MD   diclofenac sodium (Voltaren) 1 % gel Apply 4.5 inches (4 g) topically 4 times a day. 2/5/25  Yes Kyle Oliveros MD   melatonin 5 mg tablet Take 1 tablet (5 mg) by mouth once daily as needed.   Yes Historical Provider, MD   clonazePAM (KlonoPIN) 1 mg tablet Take 1 tablet (1 mg) by mouth 2 times a day. Do not fill before January 29, 2025. 1/29/25 7/3/25  Nano Owens MD     No medication comments found.   Visit Vitals  /85   Pulse 77   Resp 15   Ht 1.6 m (5' 3\")   Wt 70 kg (154 lb 5.2 oz)   SpO2 97%   BMI 27.34 kg/m²   Smoking Status Never   BSA 1.76 m²     Lab Results   Component Value Date    WBC 7.4 04/23/2025    HGB 16.3 04/23/2025    HCT 46.4 04/23/2025     04/23/2025     Lab Results   Component Value Date    TSH 0.94 02/05/2025    HGBA1C 4.9 01/10/2024    GLUCOSE 93 04/23/2025     04/23/2025    K 3.8 04/23/2025     04/23/2025    CREATININE 0.68 04/23/2025    BUN 12 04/23/2025    EGFR >90 04/23/2025    CO2 28 04/23/2025    AST 25 04/23/2025    ALT 30 04/23/2025     Encounter Date: 02/05/25   ECG 12 lead (Clinic Performed)    Narrative    KG done shows sinus rhythm at 75 bpm with normal axis normal interval   without any " ischemic changes.           Relevant Problems   Cardiac   (+) Hyperlipidemia   (+) Hypertension      Neuro   (+) Anxiety   (+) Brachial plexopathy   (+) MDD (major depressive disorder)   (+) Trauma and stressor-related disorder      Allergies and Adverse Reactions   (+) Adverse effect of anesthesia (Urinary retention)       Clinical information reviewed:   Tobacco  Allergies  Meds   Med Hx  Surg Hx   Fam Hx  Soc Hx        NPO Detail:  NPO/Void Status  Carbohydrate Drink Given Prior to Surgery? : N  Date of Last Liquid: 07/09/25  Time of Last Liquid: 0100  Date of Last Solid: 07/08/25  Time of Last Solid: 0500  Last Intake Type: GI prep  Time of Last Void: 0500         Physical Exam    Airway  Mallampati: III  TM distance: >3 FB  Neck ROM: full  Mouth opening: 3 or more finger widths     Cardiovascular   Rhythm: regular  Rate: normal     Dental - normal exam     Pulmonary Comments: Normal RR  Non-labored respiration    Abdominal            Anesthesia Plan    History of general anesthesia?: yes  History of complications of general anesthesia?: yes    ASA 2     MAC   (Standard ASA monitoring. Discussed possibility of transient awareness and recall with the patient.)  intravenous induction   Anesthetic plan and risks discussed with patient.    Plan discussed with CRNA and CAA.           [1]   Past Medical History:  Diagnosis Date    Anxiety     Depression    [2]   Past Surgical History:  Procedure Laterality Date    OTHER SURGICAL HISTORY Left     attempt to remove isiah from leg   [3]   Social History  Tobacco Use    Smoking status: Never     Passive exposure: Never    Smokeless tobacco: Never   Vaping Use    Vaping status: Never Used   Substance Use Topics    Alcohol use: Yes     Alcohol/week: 1.0 - 2.0 standard drink of alcohol     Types: 1 - 2 Standard drinks or equivalent per week    Drug use: Yes     Frequency: 5.0 times per week     Types: Marijuana     Comment: gummies   [4]   Allergies  Allergen Reactions     Lamotrigine Rash   [5]   Current Outpatient Medications:     clonazePAM (KlonoPIN) 1 mg tablet, Take 1 tablet (1 mg) by mouth 2 times a day., Disp: 60 tablet, Rfl: 2    diclofenac sodium (Voltaren) 1 % gel, Apply 4.5 inches (4 g) topically 4 times a day., Disp: 100 g, Rfl: 1    melatonin 5 mg tablet, Take 1 tablet (5 mg) by mouth once daily as needed., Disp: , Rfl:     clonazePAM (KlonoPIN) 1 mg tablet, Take 1 tablet (1 mg) by mouth 2 times a day. Do not fill before January 29, 2025., Disp: 60 tablet, Rfl: 2  No current facility-administered medications for this encounter.    Facility-Administered Medications Ordered in Other Encounters:     dexmedeTOMIDine in 0.9 % NaCL, , intravenous, PRN, SOFIA Holland, 12 mcg at 07/09/25 0738    lidocaine PF (Xylocaine) 20 mg/mL (2 %) injection, , epidural, PRN, SOFIA Holland, 60 mg at 07/09/25 0735    propofol (Diprivan) injection, , intravenous, PRN, SOFIA Holland, 250 mg at 07/09/25 0738

## 2025-07-09 NOTE — ANESTHESIA POSTPROCEDURE EVALUATION
Patient: Jaycob Caputo    Procedure Summary       Date: 07/09/25 Room / Location: [facility]    Anesthesia Start: 0732 Anesthesia Stop: 0803    Procedure: COLONOSCOPY Diagnosis: Colon cancer screening    Scheduled Providers: Vicente Gray MD; Adriana Sheth MD; SOFIA Holland Responsible Provider: Adriana Sheth MD    Anesthesia Type: MAC ASA Status: 2            Anesthesia Type: MAC    Vitals Value Taken Time   /67 07/09/25 08:01   Temp 36 °C (96.8 °F) 07/09/25 08:01   Pulse 72 07/09/25 08:01   Resp 14 07/09/25 08:01   SpO2 94 % 07/09/25 08:01       Anesthesia Post Evaluation    Patient location during evaluation: PACU  Patient participation: complete - patient participated  Level of consciousness: awake  Pain management: adequate  Multimodal analgesia pain management approach  Airway patency: patent  Cardiovascular status: hemodynamically stable  Respiratory status: spontaneous ventilation  Hydration status: euvolemic  Postoperative Nausea and Vomiting: none        No notable events documented.

## 2025-07-09 NOTE — DISCHARGE INSTRUCTIONS
Patient Instructions after a Colonoscopy      The anesthetics, sedatives or narcotics which were given to you today will be acting in your body for the next 24 hours, so you might feel a little sleepy or groggy.  This feeling should slowly wear off. Carefully read and follow the instructions.     You received sedation today:  - Do not drive or operate any machinery or power tools of any kind.   - No alcoholic beverages today, not even beer or wine.  - Do not make any important decisions or sign any legal documents.  - No over the counter medications that contain alcohol or that may cause drowsiness.  - Do not make any important decisions or sign any legal documents.  - Make sure you have someone with you for first 24 hours.    While it is common to experience mild to moderate abdominal distention, gas, or belching after your procedure, if any of these symptoms occur following discharge from the GI Lab or within one week of having your procedure, call the [facility] to be advised whether a visit to your nearest Urgent Care or Emergency Department is indicated.  Take this paper with you if you go.     - If you develop an allergic reaction to the medications that were given during your procedure such as difficulty breathing, rash, hives, severe nausea, vomiting or lightheadedness.  - If you experience chest pain, shortness of breath, severe abdominal pain, fevers and chills.  -If you develop signs and symptoms of bleeding such as blood in your spit, if your stools turn black, tarry, or bloody  - If you have not urinated within 8 hours following your procedure.  - If your IV site becomes painful, red, inflamed, or looks infected.    If you received a biopsy/polypectomy/sphincterotomy the following instructions apply below:    __ Do not use Aspirin containing products, non-steroidal medications or anti-coagulants for one week following your procedure. (Examples of these types of medications are: Advil,  Arthrotec, Aleve, Coumadin, Ecotrin, Heparin, Ibuprofen, Indocin, Motrin, Naprosyn, Nuprin, Plavix, Vioxx, and Voltarin, or their generic forms.  This list is not all-inclusive.  Check with your physician or pharmacist before resuming medications.)   __ Eat a soft diet today.  Avoid foods that are poorly digested for the next 24 hours.  These foods would include: nuts, beans, lettuce, red meats, and fried foods. Start with liquids and advance your diet as tolerated, gradually work up to eating solids.   __ Do not have a Barium Study or Enema for one week.    Your physician recommends the additional following instructions:    -You have a contact number available for emergencies. The signs and symptoms of potential delayed complications were discussed with you. You may return to normal activities tomorrow.  -Resume your previous diet.  -Continue your present medications.   -We are waiting for your pathology results.  -Your physician has recommended a repeat colonoscopy (date to be determined after pending pathology results are reviewed) for surveillance based on pathology results.  -The findings and recommendations have been discussed with you.  -The findings and recommendations were discussed with your family.  - Please see Medication Reconciliation Form for new medication/medications prescribed.       If you experience any problems or have any questions following discharge from the GI Lab, please call:    Vicente Gray MD  [phone]    To reach your physician after hours call [phone] and ask for the GI Fellow on call      Nurse Signature                                                                        Date___________________                                                                            Patient/Responsible Party Signature                                        Date___________________

## 2025-07-16 ENCOUNTER — APPOINTMENT (OUTPATIENT)
Dept: BEHAVIORAL HEALTH | Facility: CLINIC | Age: 45
End: 2025-07-16
Payer: COMMERCIAL

## 2025-07-16 DIAGNOSIS — F41.9 ANXIETY: ICD-10-CM

## 2025-07-16 PROCEDURE — 99214 OFFICE O/P EST MOD 30 MIN: CPT | Performed by: PSYCHIATRY & NEUROLOGY

## 2025-07-16 RX ORDER — CLONAZEPAM 1 MG/1
1 TABLET ORAL 2 TIMES DAILY
Qty: 60 TABLET | Refills: 2 | Status: SHIPPED | OUTPATIENT
Start: 2025-07-16 | End: 2025-10-14

## 2025-07-16 NOTE — PROGRESS NOTES
"                                                                                    Outpatient Psychiatry       Jaycob Caputo, a 44 y.o. male, presenting to Psychiatry for follow up.  Patient is referred by Lopez Burton MD.            Virtual or Telephone Consent    An interactive audio and video telecommunication system which permits real time communications between the patient (at the originating site) and provider (at the distant site) was utilized to provide this telehealth service.   Verbal consent was requested and obtained from Jaycob Caputo on this date, 07/16/25 for a telehealth visit and the patient's location was confirmed at the time of the visit.    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  Offering good incentives - 0% interest   Kenny has kept prices down even with tariffs  Feeling pretty \"down for the last month\"  Tried to make things work again with GF - had to tell her he was no longer interested in making things work out   Spoke with therapist this am about how all of this is \"triggering\"  She thinks \"my coping skills are pretty good\"   Nightmares - still getting but gummies help with that   Taking 4-5 nights a week  - which is providing relief    Needs to be \"careful\" - he does not know if he is \"quite ready\" to go back on medication  If it gets worse - may want to take trintellix again   Has an unopened bottle in case he needs it  Klonipin helps with anxiety  Still working to eat better  Patient denies SI, HI, manic or psychotic symptoms.     Psychiatric Review Of Systems:  Depressive Symptoms: negative  Manic Symptoms: negative  Anxiety Symptoms: reduced    Psychotic Symptoms: negative  Other Symptoms:     Current Medications:  Klonipin 1 mg PO BID PRN  THC " gummies at bedtime     Medical History:  Medical History   No past medical history on file.         Past Psychiatric History:   Diagnoses:   possible PTSD, anxiety, depression  Previous Psychiatrist:   Therapy:   Mika Laurent, specializes in nightmares/night time behavior - is on leave currently, he has appointment May 1, 2024  Current psychiatric medications: none   Past psychiatric medications: Cymbalta ok; SSRIs cannot take - gets more anxious; and very depressed    Past psychiatric treatments:  unknown  Hospitalizations:none  Suicide attempts: none  Family psychiatric history: unknown     Social History:   Currently lives:  [place], lives with self, has girlfriend who has kids,  8 months who is ; she does house cleaning; he tried online dating; kids ages 14, 11 and 8    Education:   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; [employer]    History of Learning Problem: none reported  Work/Finances:  car business for 6 years; ran summer camp; [employer]; 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;    Marital history/children: none  Current stressors:  step brother passed away from has cancer  Social support: girlfriend and family   Legal History: none   History: none  Family:   age 11; parents remarried   Siblings: blood brother age 39, 4 step sisters and 2 step brother;  end stage cancer step brother age 46 have 8 year old  History of violence: none reported     Substance Use History:  Tobacco use: none  Use of alcohol:  unknown  Use of caffeine:  unknown  Use of other substances: unknown  Legal consequences of substance use: n/a  Substance use disorder treatment: n/a     Record Review: brief     Medical Review Of Systems:  Pertinent items are noted in HPI.     OARRS:  No data recorded  I have personally reviewed the OARRS report for Jaycob Caputo. I have considered the risks of abuse, dependence,  addiction and diversion and I believe that it is clinically appropriate for Jaycob Caputo to be prescribed this medication     Is the patient prescribed a combination of a benzodiazepine and opioid?  No     Last Urine Drug Screen:                 Lab on 06/10/2024   Component Date Value Ref Range Status    Amphetamine Screen, Urine 06/10/2024 Presumptive Negative  Presumptive Negative Final     CUTOFF LEVEL: 500 NG/ML   Cross-reactivity has been reported with high concentrations   of the following drugs: buproprion, chloroquine, chlorpromazine,   ephedrine, mephentermine, fenfluramine, phentermine,   phenylpropanolamine, pseudoephedrine, and propranolol.    Barbiturate Screen, Urine 06/10/2024 Presumptive Negative  Presumptive Negative Final     CUTOFF LEVEL: 200 NG/ML    Benzodiazepines Screen, Urine 06/10/2024 Presumptive Negative  Presumptive Negative Final     CUTOFF LEVEL: 200 NG/ML    Cannabinoid Screen, Urine 06/10/2024 Presumptive Positive (A)  Presumptive Negative Final     CUTOFF LEVEL: 50 NG/ML    Cocaine Metabolite Screen, Urine 06/10/2024 Presumptive Negative  Presumptive Negative Final     CUTOFF LEVEL: 150 NG/ML    Fentanyl Screen, Urine 06/10/2024 Presumptive Negative  Presumptive Negative Final     CUTOFF LEVEL: 5 NG/ML    Opiate Screen, Urine 06/10/2024 Presumptive Negative  Presumptive Negative Final     CUTOFF LEVEL: 300 NG/ML  The opiate screen does not detect fentanyl, meperidine, or   tramadol. Oxycodone is not consistently detected (refer to  Oxycodone Screen, Urine result).    Oxycodone Screen, Urine 06/10/2024 Presumptive Negative  Presumptive Negative Final     CUTOFF LEVEL: 100 NG/ML  This test will accurately detect both oxycodone and oxymorphone.    PCP Screen, Urine 06/10/2024 Presumptive Negative  Presumptive Negative Final     CUTOFF LEVEL:  25 NG/ML  Cross-reactivity has been reported with dextromethorphan.    Methadone Screen, Urine 06/10/2024 Presumptive Negative  Presumptive  Negative Final     CUTOFF LEVEL: 150 NG/ML  The metabolite L-alpha-acetylmethadol (LAAM) is not  detected by this method in concentrations that would  be found in the urine of patients on LAAM therapy.    24-Seu-3-carboxy-THC, Urn, Quant 06/10/2024 60  ng/mL Final     Comment: INTERPRETIVE INFORMATION: THC Metabolite, Urine,                             Quantitative     Methodology: Quantitative Liquid Chromatography-Tandem Mass   Spectrometry  Positive cutoff: 15 ng/mL  For medical purposes only; not valid for forensic use.  The drug analyte detected in this assay, 9-carboxy THC, is a   metabolite of delta-9-tetrahydrocannabinol (THC). Detection of   9-carboxy THC suggests use of, or exposure to, a product   containing THC.  This test cannot distinguish between prescribed   or non-prescribed forms of THC, nor can it distinguish between   active or passive use. The 9-carboxy THC metabolite can be   detected in urine for several weeks. Normalization of results to   creatinine concentration can help document elimination or suggest   recent use, when specimens are collected at least one week apart.     This test was developed and its performance characteristics   determined by [facility]. It has not been cleared or   approved by the [place] Food and Drug                             Administration. This test was   performed in a CLIA certified laboratory and is intended for   clinical purposes.  Performed By: [facility]  [address] [place] [id number]  : Jaycob Rodney MD, PhD  CLIA Number: [id number]         Controlled Substance Agreement:  signed today      Activities of Daily Living:   Is your overall impression that this patient is benefiting (symptom reduction outweighs side effects) from benzodiazepine therapy? Yes        Objective   Mental Status Exam  Appearance: casually dressed, good g/h  Attitude: Calm, cooperative, and engaged in conversation.  Behavior: Appropriate  "eye contact.   Motor Activity: No psychomotor agitation or retardation. No abnormal movements, tremors or tics. No evidence of extrapyramidal symptoms or tardive dyskinesia.  Speech: Regular rate, rhythm, volume. Spontaneous, no pressured speech.  Mood: \"okay\"  euthymic  Affect: full range, mood congruent.  Thought Process: Linear, logical, and goal-directed. No loose associations or gross thought disorganization.  Thought Content: Denied current suicidal ideation or thoughts of harm to self, denied homicidal ideation or thoughts of harm to others. No delusional thinking elicited. No perseverations or obsessions identified.   Perception: Did not endorse auditory or visual hallucinations, did not appear to be responding to hallucinatory stimuli.   Cognition: Alert, oriented x3. Preserved attention span and concentration, recent and remote memory. Adequate fund of knowledge. No deficits in language.   Insight: Fair, in regards to understanding mental health condition  Judgement: Fair        Vitals: virtual    BMI:  27.34  Falls risk:  low            Risk Assessment:     Risk of harm to self: low     Risk of harm to others: low      DXS:   Anxiety unspecified  MDD, recurrent     Assessment:  Patient is a 44 year old  male  with a history of depression and anxiety who has been struggling with breakup.  He is uncertain he has reached the point where he wants to restart the Trintellix.         Patient needs to do urine drug screen.  CSA signed.      Patient denies SI, HI, manic or psychotic symptoms.  No side effects reported.        Plan/Recommendations:  Medications: continue Klonipin 1 mg PO daily and PRN at bedtime anxiety/sleep  Urine Drug Screen pending  Continue therapy  Follow up:  4 weeks  Call  Psychiatry at [phone] with issues.  For [facility] residents, [place] is a 24/7 hotline you can call for assistance [[phone]]. Please call [phone] or go to your closest Emergency Room if you feel " unsafe. This includes thoughts of hurting yourself or anyone else, or having other troubles such as hearing voices, seeing visions, or having new and scary thoughts about the people around you.     Review with patient: Treatment plan reviewed with the patient.  Medication risks/benefit reviewed with the patient

## 2025-08-02 ENCOUNTER — OFFICE VISIT (OUTPATIENT)
Dept: URGENT CARE | Age: 45
End: 2025-08-02
Payer: COMMERCIAL

## 2025-08-02 VITALS
HEIGHT: 64 IN | HEART RATE: 79 BPM | SYSTOLIC BLOOD PRESSURE: 142 MMHG | DIASTOLIC BLOOD PRESSURE: 103 MMHG | WEIGHT: 156 LBS | RESPIRATION RATE: 17 BRPM | TEMPERATURE: 97.8 F | BODY MASS INDEX: 26.63 KG/M2 | OXYGEN SATURATION: 95 %

## 2025-08-02 DIAGNOSIS — H00.011 HORDEOLUM EXTERNUM OF RIGHT UPPER EYELID: Primary | ICD-10-CM

## 2025-08-02 DIAGNOSIS — R03.0 ELEVATED BLOOD PRESSURE READING: ICD-10-CM

## 2025-08-02 RX ORDER — ERYTHROMYCIN 5 MG/G
OINTMENT OPHTHALMIC 4 TIMES DAILY
Qty: 3.5 G | Refills: 0 | Status: SHIPPED | OUTPATIENT
Start: 2025-08-02 | End: 2025-08-09

## 2025-08-02 ASSESSMENT — ENCOUNTER SYMPTOMS
DEPRESSION: 0
LOSS OF SENSATION IN FEET: 0
EYE ITCHING: 1
EYE PAIN: 1
EYE DISCHARGE: 1
PHOTOPHOBIA: 0
OCCASIONAL FEELINGS OF UNSTEADINESS: 0

## 2025-08-02 ASSESSMENT — VISUAL ACUITY: OU: 1

## 2025-08-02 NOTE — PROGRESS NOTES
"Subjective   Patient ID: Jaycob Caputo is a 44 y.o. male. They present today with a chief complaint of Eye Problem (Pink eye congestion headache ).    History of Present Illness  Pt presents with R upper eyelid redness, swelling and tenderness x 1 day. No trauma. Wears contacts but has not had them in for several days. No fb introduction or sensation. Some itching and white drainage crusted on eye in the morning. No fever, dizziness, eye redness, photophobia, changes in vision.     Pt feels like coming down with a cold as well the last day, chest congestion and headache. No sick contacts. No fever chills cp sob cough sore throat.       History provided by:  Patient      Past Medical History  Allergies as of 08/02/2025 - Reviewed 08/02/2025   Allergen Reaction Noted    Lamotrigine Rash 07/23/2013       Prescriptions Prior to Admission[1]     Medical History[2]    Surgical History[3]     reports that he has never smoked. He has never been exposed to tobacco smoke. He has never used smokeless tobacco. He reports current alcohol use of about 2.0 standard drinks of alcohol per week. He reports current drug use. Frequency: 5.00 times per week. Drug: Marijuana.    Review of Systems  Review of Systems   Eyes:  Positive for pain, discharge and itching. Negative for photophobia and visual disturbance.   All other systems reviewed and are negative.                                 Objective    Vitals:    08/02/25 1133   BP: (!) 142/103   BP Location: Left arm   Patient Position: Sitting   BP Cuff Size: Small adult   Pulse: 79   Resp: 17   Temp: 36.6 °C (97.8 °F)   TempSrc: Oral   SpO2: 95%   Weight: 70.8 kg (156 lb)   Height: 1.626 m (5' 4\")     No LMP for male patient.    Physical Exam  Vitals and nursing note reviewed.   Constitutional:       General: He is not in acute distress.     Appearance: Normal appearance. He is not ill-appearing, toxic-appearing or diaphoretic.   HENT:      Head: Normocephalic and atraumatic.    "   Right Ear: Tympanic membrane, ear canal and external ear normal.      Left Ear: Tympanic membrane, ear canal and external ear normal.      Nose: Nose normal.      Mouth/Throat:      Lips: Pink.      Mouth: Mucous membranes are moist.      Pharynx: Oropharynx is clear. Uvula midline. No pharyngeal swelling, oropharyngeal exudate, posterior oropharyngeal erythema, uvula swelling or postnasal drip.      Tonsils: No tonsillar exudate or tonsillar abscesses.     Eyes:      General: Lids are everted, no foreign bodies appreciated. Vision grossly intact. Gaze aligned appropriately.         Right eye: Hordeolum present. No foreign body or discharge.         Left eye: No foreign body, discharge or hordeolum.      Extraocular Movements: Extraocular movements intact.      Right eye: Normal extraocular motion and no nystagmus.      Left eye: Normal extraocular motion and no nystagmus.      Conjunctiva/sclera: Conjunctivae normal.      Right eye: Right conjunctiva is not injected. No chemosis, exudate or hemorrhage.     Pupils: Pupils are equal, round, and reactive to light.      Right eye: Pupil is round, reactive and not sluggish.      Left eye: Pupil is round, reactive and not sluggish.        Comments: Vision is grossly intact. No FB. No corneal haziness. No pain or visual changes with EOM. EOM intact. PERRLA. No periorbital edema erythema tenderness or crepitus. No photophobia.      Cardiovascular:      Rate and Rhythm: Normal rate and regular rhythm.      Pulses: Normal pulses.      Heart sounds: No murmur heard.  Pulmonary:      Effort: Pulmonary effort is normal. No respiratory distress.      Breath sounds: No wheezing, rhonchi or rales.     Neurological:      Mental Status: He is alert.         Procedures    Point of Care Test & Imaging Results from this visit  No results found for this visit on 08/02/25.   Imaging  No results found.    Cardiology, Vascular, and Other Imaging  No other imaging results found for the  past 2 days      Diagnostic study results (if any) were reviewed by Irene Londono PA-C.    Assessment/Plan   Allergies, medications, history, and pertinent labs/EKGs/Imaging reviewed by Irene Londono PA-C.     Medical Decision Making  Pt declined viral testing today   Will treat for hordeolum externum with topical erythromycin. Advised warm compresses 3-4 times daily followed by gentle lid massaging. Leave contacts out until sx resolve  ED precautions d/w patient     Orders and Diagnoses  Diagnoses and all orders for this visit:  Hordeolum externum of right upper eyelid  -     erythromycin (Romycin) 5 mg/gram (0.5 %) ophthalmic ointment; Apply to right eye 4 times a day for 7 days. Apply Amount per Dose: 0.5 inch (~1 cm) per dose.  Elevated blood pressure reading  -hx of htn. Monitor at home, follow up with pcp as soon as possible.     Medical Admin Record      Patient disposition: Home    Electronically signed by Irene Londono PA-C  2:23 PM           [1] (Not in a hospital admission)   [2]   Past Medical History:  Diagnosis Date    Anxiety     Depression    [3]   Past Surgical History:  Procedure Laterality Date    OTHER SURGICAL HISTORY Left     attempt to remove isiah from leg

## 2025-08-02 NOTE — PATIENT INSTRUCTIONS
Warm compresses to the right eye 3-4 times daily for 15-20 minutes followed by gentle lid massaging

## 2025-08-20 ENCOUNTER — APPOINTMENT (OUTPATIENT)
Dept: BEHAVIORAL HEALTH | Facility: CLINIC | Age: 45
End: 2025-08-20
Payer: COMMERCIAL

## 2025-08-20 DIAGNOSIS — F41.9 ANXIETY: ICD-10-CM

## 2025-08-20 PROCEDURE — 99214 OFFICE O/P EST MOD 30 MIN: CPT | Performed by: PSYCHIATRY & NEUROLOGY

## 2025-08-25 LAB
1OH-MIDAZOLAM UR-MCNC: NEGATIVE NG/ML
7AMINOCLONAZEPAM UR-MCNC: 818 NG/ML
A-OH ALPRAZ UR-MCNC: NEGATIVE NG/ML
A-OH-TRIAZOLAM UR-MCNC: NEGATIVE NG/ML
AMPHETAMINES UR QL: NEGATIVE NG/ML
BARBITURATES UR QL: NEGATIVE NG/ML
BENZODIAZ UR QL: POSITIVE NG/ML
BZE UR QL: NEGATIVE NG/ML
CREAT UR-MCNC: 145.3 MG/DL
DRUG SCREEN COMMENT UR-IMP: ABNORMAL
DRUG SCREEN COMMENT UR-IMP: ABNORMAL
FENTANYL UR QL SCN: NEGATIVE NG/ML
LORAZEPAM UR-MCNC: NEGATIVE NG/ML
METHADONE UR QL: NEGATIVE NG/ML
NORDIAZEPAM UR-MCNC: NEGATIVE NG/ML
OH-ETHYLFLURAZ UR-MCNC: NEGATIVE NG/ML
OPIATES UR QL: NEGATIVE NG/ML
OXAZEPAM UR-MCNC: NEGATIVE NG/ML
OXIDANTS UR QL: NEGATIVE MCG/ML
OXYCODONE UR QL: NEGATIVE NG/ML
PCP UR QL: NEGATIVE NG/ML
PH UR: 7.3 [PH] (ref 4.5–9)
QUEST NOTES AND COMMENTS: ABNORMAL
TEMAZEPAM UR-MCNC: NEGATIVE NG/ML
THC UR QL: POSITIVE NG/ML
THC UR-MCNC: 398 NG/ML

## 2025-10-15 ENCOUNTER — APPOINTMENT (OUTPATIENT)
Dept: BEHAVIORAL HEALTH | Facility: CLINIC | Age: 45
End: 2025-10-15
Payer: COMMERCIAL